# Patient Record
Sex: FEMALE | Race: WHITE | NOT HISPANIC OR LATINO | Employment: OTHER | URBAN - METROPOLITAN AREA
[De-identification: names, ages, dates, MRNs, and addresses within clinical notes are randomized per-mention and may not be internally consistent; named-entity substitution may affect disease eponyms.]

---

## 2018-02-05 ENCOUNTER — OFFICE VISIT (OUTPATIENT)
Dept: URGENT CARE | Facility: CLINIC | Age: 58
End: 2018-02-05
Payer: COMMERCIAL

## 2018-02-05 VITALS
RESPIRATION RATE: 19 BRPM | DIASTOLIC BLOOD PRESSURE: 78 MMHG | SYSTOLIC BLOOD PRESSURE: 143 MMHG | HEART RATE: 71 BPM | BODY MASS INDEX: 26.22 KG/M2 | WEIGHT: 148 LBS | HEIGHT: 63 IN | TEMPERATURE: 99 F | OXYGEN SATURATION: 98 %

## 2018-02-05 DIAGNOSIS — J20.9 ACUTE BRONCHITIS, UNSPECIFIED ORGANISM: Primary | ICD-10-CM

## 2018-02-05 PROCEDURE — 96372 THER/PROPH/DIAG INJ SC/IM: CPT | Mod: S$GLB,,, | Performed by: FAMILY MEDICINE

## 2018-02-05 PROCEDURE — 99203 OFFICE O/P NEW LOW 30 MIN: CPT | Mod: S$GLB,,, | Performed by: NURSE PRACTITIONER

## 2018-02-05 PROCEDURE — 3008F BODY MASS INDEX DOCD: CPT | Mod: S$GLB,,, | Performed by: NURSE PRACTITIONER

## 2018-02-05 RX ORDER — BETAMETHASONE SODIUM PHOSPHATE AND BETAMETHASONE ACETATE 3; 3 MG/ML; MG/ML
6 INJECTION, SUSPENSION INTRA-ARTICULAR; INTRALESIONAL; INTRAMUSCULAR; SOFT TISSUE
Status: COMPLETED | OUTPATIENT
Start: 2018-02-05 | End: 2018-02-05

## 2018-02-05 RX ORDER — TIOTROPIUM BROMIDE 18 UG/1
18 CAPSULE ORAL; RESPIRATORY (INHALATION) DAILY
Qty: 30 CAPSULE | Refills: 2 | Status: SHIPPED | OUTPATIENT
Start: 2018-02-05 | End: 2018-08-14

## 2018-02-05 RX ORDER — PREDNISONE 20 MG/1
20 TABLET ORAL DAILY
Qty: 5 TABLET | Refills: 0 | Status: SHIPPED | OUTPATIENT
Start: 2018-02-05 | End: 2018-02-10

## 2018-02-05 RX ADMIN — BETAMETHASONE SODIUM PHOSPHATE AND BETAMETHASONE ACETATE 6 MG: 3; 3 INJECTION, SUSPENSION INTRA-ARTICULAR; INTRALESIONAL; INTRAMUSCULAR; SOFT TISSUE at 03:02

## 2018-02-05 NOTE — PROGRESS NOTES
"Subjective:       Patient ID: Mallorie rGeen is a 57 y.o. female.    Vitals:  height is 5' 3" (1.6 m) and weight is 67.1 kg (148 lb). Her temperature is 98.5 °F (36.9 °C). Her blood pressure is 143/78 (abnormal) and her pulse is 71. Her respiration is 19 and oxygen saturation is 98%.     Chief Complaint: Cough    Pt states she has been having symptoms for the last week. Has a h/o asthmatic bronchitis and has been using spiriva but she ran out a few days ago. States the cough is now getting worse. Denies fever, chills.       Cough   This is a new problem. The current episode started in the past 7 days. The problem has been gradually worsening. The problem occurs every few minutes. The cough is productive of sputum. Associated symptoms include ear pain. Pertinent negatives include no chest pain, chills, eye redness, fever, headaches, myalgias, rash, sore throat, shortness of breath or wheezing. The symptoms are aggravated by lying down. Treatments tried: mucinex, sudafed and singular. The treatment provided no relief. Her past medical history is significant for bronchitis.     Review of Systems   Constitution: Positive for malaise/fatigue. Negative for chills and fever.   HENT: Positive for ear pain and hoarse voice. Negative for congestion and sore throat.    Eyes: Negative for discharge and redness.   Cardiovascular: Negative for chest pain, dyspnea on exertion and leg swelling.   Respiratory: Positive for cough and sputum production. Negative for shortness of breath and wheezing.    Hematologic/Lymphatic: Negative for adenopathy.   Skin: Negative for color change and rash.   Musculoskeletal: Negative for myalgias.   Gastrointestinal: Negative for abdominal pain and nausea.   Neurological: Negative for dizziness, headaches and light-headedness.   All other systems reviewed and are negative.      Objective:      Physical Exam   Constitutional: She is oriented to person, place, and time. Vital signs are normal. She " appears well-developed and well-nourished.  Non-toxic appearance. She does not have a sickly appearance. She does not appear ill.   Appears run down on assessment   HENT:   Head: Normocephalic and atraumatic.   Right Ear: Hearing, tympanic membrane, external ear and ear canal normal.   Left Ear: Hearing, tympanic membrane, external ear and ear canal normal.   Nose: Nose normal. Right sinus exhibits no maxillary sinus tenderness and no frontal sinus tenderness. Left sinus exhibits no maxillary sinus tenderness and no frontal sinus tenderness.   Mouth/Throat: Uvula is midline, oropharynx is clear and moist and mucous membranes are normal.   Eyes: Conjunctivae and EOM are normal. Pupils are equal, round, and reactive to light.   Neck: Normal range of motion. Neck supple.   Cardiovascular: Normal rate, regular rhythm and normal heart sounds.    Pulmonary/Chest: Effort normal. No respiratory distress. She has no decreased breath sounds. She has no wheezes.   Deep, hacking cough present on exam.    Abdominal: Soft. Bowel sounds are normal. There is no tenderness.   Musculoskeletal: Normal range of motion.   Lymphadenopathy:     She has no cervical adenopathy.   Neurological: She is alert and oriented to person, place, and time.   Skin: Skin is warm and dry. Capillary refill takes less than 2 seconds. No rash noted.   Psychiatric: She has a normal mood and affect.   Nursing note and vitals reviewed.      Assessment:       1. Acute bronchitis, unspecified organism        Plan:         Acute bronchitis, unspecified organism  -     tiotropium (SPIRIVA WITH HANDIHALER) 18 mcg inhalation capsule; Inhale 1 capsule (18 mcg total) into the lungs once daily. Controller  Dispense: 30 capsule; Refill: 2  -     betamethasone acetate-betamethasone sodium phosphate injection 6 mg; Inject 1 mL (6 mg total) into the muscle one time.  -     predniSONE (DELTASONE) 20 MG tablet; Take 1 tablet (20 mg total) by mouth once daily.  Dispense: 5  tablet; Refill: 0      Patient Instructions       Please return here or go to the Emergency Department for any concerns or worsening of condition.  If you were prescribed antibiotics, please take them to completion.  If you were prescribed a narcotic medication, do not drive or operate heavy equipment or machinery while taking these medications.  Please follow up with your primary care doctor or specialist as needed.    If you  smoke, please stop smoking.    Acute Bronchitis  Your healthcare provider has told you that you have acute bronchitis. Bronchitis is infection or inflammation of the bronchial tubes (airways in the lungs). Normally, air moves easily in and out of the airways. Bronchitis narrows the airways, making it harder for air to flow in and out of the lungs. This causes symptoms such as shortness of breath, coughing up yellow or green mucus, and wheezing. Bronchitis can be acute or chronic. Acute means the condition comes on quickly and goes away in a short time, usually within 3 to 10 days. Chronic means a condition lasts a long time and often comes back.    What causes acute bronchitis?  Acute bronchitis almost always starts as a viral respiratory infection, such as a cold or the flu. Certain factors make it more likely for a cold or flu to turn into bronchitis. These include being very young, being elderly, having a heart or lung problem, or having a weak immune system. Cigarette smoking also makes bronchitis more likely.  When bronchitis develops, the airways become swollen. The airways may also become infected with bacteria. This is known as a secondary infection.  Diagnosing acute bronchitis  Your healthcare provider will examine you and ask about your symptoms and health history. You may also have a sputum culture to test the fluid in your lungs. Chest X-rays may be done to look for infection in the lungs.  Treating acute bronchitis  Bronchitis usually clears up as the cold or flu goes away. You  can help feel better faster by doing the following:  · Take medicine as directed. You may be told to take ibuprofen or other over-the-counter medicines. These help relieve inflammation in your bronchial tubes. Your healthcare provider may prescribe an inhaler to help open up the bronchial tubes. Most of the time, acute bronchitis is caused by a viral infection. Antibiotics are usually not prescribed for viral infections.  · Drink plenty of fluids, such as water, juice, or warm soup. Fluids loosen mucus so that you can cough it up. This helps you breathe more easily. Fluids also prevent dehydration.  · Make sure you get plenty of rest.  · Do not smoke. Do not allow anyone else to smoke in your home.  Recovery and follow-up  Follow up with your doctor as you are told. You will likely feel better in a week or two. But a dry cough can linger beyond that time. Let your doctor know if you still have symptoms (other than a dry cough) after 2 weeks, or if youre prone to getting bronchial infections. Take steps to protect yourself from future infections. These steps include stopping smoking and avoiding tobacco smoke, washing your hands often, and getting a yearly flu shot.  When to call your healthcare provider  Call the healthcare provider if you have any of the following:  · Fever of 100.4°F (38.0°C) or higher, or as advised  · Symptoms that get worse, or new symptoms  · Trouble breathing  · Symptoms that dont start to improve within a week, or within 3 days of taking antibiotics   Date Last Reviewed: 12/1/2016  © 5029-7714 [employer] [facility]. [address] [place] [address], [place] [address]. All rights reserved. This information is not intended as a substitute for professional medical care. Always follow your healthcare professional's instructions.        Bronchitis, Viral (Adult)    You have a viral bronchitis. Bronchitis is inflammation and swelling of the lining of the lungs. This is often caused by an infection.  Symptoms include a dry, hacking cough that is worse at night. The cough may bring up yellow-green mucus. You may also feel short of breath or wheeze. Other symptoms may include tiredness, chest discomfort, and chills.  Bronchitis that is caused by a virus is not treated with antibiotics. Instead, medicines may be given to help relieve symptoms. Symptoms can last up to 2 weeks, although the cough may last much longer.  This illness is contagious during the first few days and is spread through the air by coughing and sneezing, or by direct contact (touching the sick person and then touching your own eyes, nose, or mouth).  Most viral illnesses resolve within 10 to 14 days with rest and simple home remedies, although they may sometimes last for several weeks.  Home care  · If symptoms are severe, rest at home for the first 2 to 3 days. When you go back to your usual activities, don't let yourself get too tired.  · Do not smoke. Also avoid being exposed to secondhand smoke.  · You may use over-the-counter medicine to control fever or pain, unless another pain medicine was prescribed. (Note: If you have chronic liver or kidney disease or have ever had a stomach ulcer or gastrointestinal bleeding, talk with your healthcare provider before using these medicines. Also talk to your provider if you are taking medicine to prevent blood clots.) Aspirin should never be given to anyone younger than 18 years of age who is ill with a viral infection or fever. It may cause severe liver or brain damage.  · Your appetite may be poor, so a light diet is fine. Avoid dehydration by drinking 6 to 8 glasses of fluids per day (such as water, soft drinks, sports drinks, juices, tea, or soup). Extra fluids will help loosen secretions in the nose and lungs.  · Over-the-counter cough, cold, and sore-throat medicines will not shorten the length of the illness, but they may help to reduce symptoms. (Note: Do not use decongestants if you have high  blood pressure.)  Follow-up care  Follow up with your healthcare provider, or as advised. If you had an X-ray or ECG (electrocardiogram), a specialist will review it. You will be notified of any new findings that may affect your care.  Note: If you are age 65 or older, or if you have a chronic lung disease or condition that affects your immune system, or you smoke, talk to your healthcare provider about having pneumococcal vaccinations and a yearly influenza vaccination (flu shot).  When to seek medical advice  Call your healthcare provider right away if any of these occur:  · Fever of 100.4°F (38°C) or higher  · Coughing up increased amounts of colored sputum  · Weakness, drowsiness, headache, facial pain, ear pain, or a stiff neck  Call 911, or get immediate medical care  Contact emergency services right away if any of these occur:  · Coughing up blood  · Worsening weakness, drowsiness, headache, or stiff neck  · Trouble breathing, wheezing, or pain with breathing  Date Last Reviewed: 9/13/2015 © 2000-2017 [employer]. [address] [place] [address], [place] [address]. All rights reserved. This information is not intended as a substitute for professional medical care. Always follow your healthcare professional's instructions.

## 2018-02-05 NOTE — PATIENT INSTRUCTIONS
Please return here or go to the Emergency Department for any concerns or worsening of condition.  If you were prescribed antibiotics, please take them to completion.  If you were prescribed a narcotic medication, do not drive or operate heavy equipment or machinery while taking these medications.  Please follow up with your primary care doctor or specialist as needed.    If you  smoke, please stop smoking.    Acute Bronchitis  Your healthcare provider has told you that you have acute bronchitis. Bronchitis is infection or inflammation of the bronchial tubes (airways in the lungs). Normally, air moves easily in and out of the airways. Bronchitis narrows the airways, making it harder for air to flow in and out of the lungs. This causes symptoms such as shortness of breath, coughing up yellow or green mucus, and wheezing. Bronchitis can be acute or chronic. Acute means the condition comes on quickly and goes away in a short time, usually within 3 to 10 days. Chronic means a condition lasts a long time and often comes back.    What causes acute bronchitis?  Acute bronchitis almost always starts as a viral respiratory infection, such as a cold or the flu. Certain factors make it more likely for a cold or flu to turn into bronchitis. These include being very young, being elderly, having a heart or lung problem, or having a weak immune system. Cigarette smoking also makes bronchitis more likely.  When bronchitis develops, the airways become swollen. The airways may also become infected with bacteria. This is known as a secondary infection.  Diagnosing acute bronchitis  Your healthcare provider will examine you and ask about your symptoms and health history. You may also have a sputum culture to test the fluid in your lungs. Chest X-rays may be done to look for infection in the lungs.  Treating acute bronchitis  Bronchitis usually clears up as the cold or flu goes away. You can help feel better faster by doing the  following:  · Take medicine as directed. You may be told to take ibuprofen or other over-the-counter medicines. These help relieve inflammation in your bronchial tubes. Your healthcare provider may prescribe an inhaler to help open up the bronchial tubes. Most of the time, acute bronchitis is caused by a viral infection. Antibiotics are usually not prescribed for viral infections.  · Drink plenty of fluids, such as water, juice, or warm soup. Fluids loosen mucus so that you can cough it up. This helps you breathe more easily. Fluids also prevent dehydration.  · Make sure you get plenty of rest.  · Do not smoke. Do not allow anyone else to smoke in your home.  Recovery and follow-up  Follow up with your doctor as you are told. You will likely feel better in a week or two. But a dry cough can linger beyond that time. Let your doctor know if you still have symptoms (other than a dry cough) after 2 weeks, or if youre prone to getting bronchial infections. Take steps to protect yourself from future infections. These steps include stopping smoking and avoiding tobacco smoke, washing your hands often, and getting a yearly flu shot.  When to call your healthcare provider  Call the healthcare provider if you have any of the following:  · Fever of 100.4°F (38.0°C) or higher, or as advised  · Symptoms that get worse, or new symptoms  · Trouble breathing  · Symptoms that dont start to improve within a week, or within 3 days of taking antibiotics   Date Last Reviewed: 12/1/2016  © 6307-0800 [employer]. [address] [place] [address], [place] [address]. All rights reserved. This information is not intended as a substitute for professional medical care. Always follow your healthcare professional's instructions.        Bronchitis, Viral (Adult)    You have a viral bronchitis. Bronchitis is inflammation and swelling of the lining of the lungs. This is often caused by an infection. Symptoms include a dry, hacking cough that  is worse at night. The cough may bring up yellow-green mucus. You may also feel short of breath or wheeze. Other symptoms may include tiredness, chest discomfort, and chills.  Bronchitis that is caused by a virus is not treated with antibiotics. Instead, medicines may be given to help relieve symptoms. Symptoms can last up to 2 weeks, although the cough may last much longer.  This illness is contagious during the first few days and is spread through the air by coughing and sneezing, or by direct contact (touching the sick person and then touching your own eyes, nose, or mouth).  Most viral illnesses resolve within 10 to 14 days with rest and simple home remedies, although they may sometimes last for several weeks.  Home care  · If symptoms are severe, rest at home for the first 2 to 3 days. When you go back to your usual activities, don't let yourself get too tired.  · Do not smoke. Also avoid being exposed to secondhand smoke.  · You may use over-the-counter medicine to control fever or pain, unless another pain medicine was prescribed. (Note: If you have chronic liver or kidney disease or have ever had a stomach ulcer or gastrointestinal bleeding, talk with your healthcare provider before using these medicines. Also talk to your provider if you are taking medicine to prevent blood clots.) Aspirin should never be given to anyone younger than 18 years of age who is ill with a viral infection or fever. It may cause severe liver or brain damage.  · Your appetite may be poor, so a light diet is fine. Avoid dehydration by drinking 6 to 8 glasses of fluids per day (such as water, soft drinks, sports drinks, juices, tea, or soup). Extra fluids will help loosen secretions in the nose and lungs.  · Over-the-counter cough, cold, and sore-throat medicines will not shorten the length of the illness, but they may help to reduce symptoms. (Note: Do not use decongestants if you have high blood pressure.)  Follow-up care  Follow  up with your healthcare provider, or as advised. If you had an X-ray or ECG (electrocardiogram), a specialist will review it. You will be notified of any new findings that may affect your care.  Note: If you are age 65 or older, or if you have a chronic lung disease or condition that affects your immune system, or you smoke, talk to your healthcare provider about having pneumococcal vaccinations and a yearly influenza vaccination (flu shot).  When to seek medical advice  Call your healthcare provider right away if any of these occur:  · Fever of 100.4°F (38°C) or higher  · Coughing up increased amounts of colored sputum  · Weakness, drowsiness, headache, facial pain, ear pain, or a stiff neck  Call 911, or get immediate medical care  Contact emergency services right away if any of these occur:  · Coughing up blood  · Worsening weakness, drowsiness, headache, or stiff neck  · Trouble breathing, wheezing, or pain with breathing  Date Last Reviewed: 9/13/2015  © 3772-0133 [employer]. [address] [place] [address], [place] [address]. All rights reserved. This information is not intended as a substitute for professional medical care. Always follow your healthcare professional's instructions.

## 2018-08-14 ENCOUNTER — OFFICE VISIT (OUTPATIENT)
Dept: URGENT CARE | Facility: CLINIC | Age: 58
End: 2018-08-14
Payer: COMMERCIAL

## 2018-08-14 VITALS
SYSTOLIC BLOOD PRESSURE: 120 MMHG | WEIGHT: 148 LBS | HEART RATE: 74 BPM | HEIGHT: 63 IN | TEMPERATURE: 97 F | BODY MASS INDEX: 26.22 KG/M2 | DIASTOLIC BLOOD PRESSURE: 80 MMHG | OXYGEN SATURATION: 98 %

## 2018-08-14 DIAGNOSIS — M54.50 ACUTE RIGHT-SIDED LOW BACK PAIN WITHOUT SCIATICA: Primary | ICD-10-CM

## 2018-08-14 PROCEDURE — 3008F BODY MASS INDEX DOCD: CPT | Mod: CPTII,S$GLB,, | Performed by: SURGERY

## 2018-08-14 PROCEDURE — 96372 THER/PROPH/DIAG INJ SC/IM: CPT | Mod: S$GLB,,, | Performed by: SURGERY

## 2018-08-14 PROCEDURE — 99214 OFFICE O/P EST MOD 30 MIN: CPT | Mod: 25,S$GLB,, | Performed by: SURGERY

## 2018-08-14 RX ORDER — BETAMETHASONE SODIUM PHOSPHATE AND BETAMETHASONE ACETATE 3; 3 MG/ML; MG/ML
9 INJECTION, SUSPENSION INTRA-ARTICULAR; INTRALESIONAL; INTRAMUSCULAR; SOFT TISSUE ONCE
Status: COMPLETED | OUTPATIENT
Start: 2018-08-14 | End: 2018-08-14

## 2018-08-14 RX ORDER — DICLOFENAC SODIUM 75 MG/1
75 TABLET, DELAYED RELEASE ORAL 2 TIMES DAILY PRN
Qty: 60 TABLET | Refills: 0 | Status: SHIPPED | OUTPATIENT
Start: 2018-08-14 | End: 2018-09-13

## 2018-08-14 RX ORDER — CYCLOBENZAPRINE HCL 10 MG
10 TABLET ORAL 3 TIMES DAILY PRN
Qty: 21 TABLET | Refills: 0 | Status: SHIPPED | OUTPATIENT
Start: 2018-08-14 | End: 2018-08-21

## 2018-08-14 RX ADMIN — BETAMETHASONE SODIUM PHOSPHATE AND BETAMETHASONE ACETATE 9 MG: 3; 3 INJECTION, SUSPENSION INTRA-ARTICULAR; INTRALESIONAL; INTRAMUSCULAR; SOFT TISSUE at 10:08

## 2018-08-14 NOTE — PATIENT INSTRUCTIONS
Self-Care for Low Back Pain    Most people have low back pain now and then. In many cases, it isnt serious and self-care can help. Sometimes low back pain can be a sign of a bigger problem. Call your healthcare provider if your pain returns often or gets worse over time. For the long-term care of your back, get regular exercise, lose any excess weight and learn good posture.  Take a short rest  Lying down during the day may be beneficial for short periods of time if severe pain increases with sitting or standing. Long-term bed rest could be detrimental.  Reduce pain and swelling  Cold reduces swelling. Both cold and heat can reduce pain. Protect your skin by placing a towel between your body and the ice or heat source.  · For the first few days, apply an ice pack for 15 to 20 minutes .  · After the first few days, try heat for 15 minutes at a time to ease pain. Never sleep on a heating pad.  · Over-the-counter medicine can help control pain and swelling. Try aspirin or ibuprofen.  Exercise  Exercise can help your back heal. It also helps your back get stronger and more flexible, preventing any reinjury. Ask your healthcare provider about specific exercises for your back.  Use good posture to avoid reinjury  · When moving, bend at the hips and knees. Dont bend at the waist or twist around.  · When lifting, keep the object close to your body. Dont try to lift more than you can handle.  · When sitting, keep your lower back supported. Use a rolled-up towel as needed.  Seek immediate medical care if:  · Youre unable to stand or walk.  · You have a temperature over 100.4°F (38.0°C)  · You have frequent, painful, or bloody urination.  · You have severe abdominal pain.  · You have a sharp, stabbing pain.  · Your pain is constant.  · You have pain or numbness in your leg.  · You feel pain in a new area of your back.  · You notice that the pain isnt decreasing after more than a week.   Date Last Reviewed: 9/29/2015  ©  4175-8991 [employer]. [address] [place] [address], [place] [address]. All rights reserved. This information is not intended as a substitute for professional medical care. Always follow your healthcare professional's instructions.

## 2018-08-14 NOTE — PROGRESS NOTES
"Subjective:       Patient ID: Mallorie Green is a 58 y.o. female.    Vitals:  height is 5' 3" (1.6 m) and weight is 67.1 kg (148 lb). Her temperature is 97.2 °F (36.2 °C). Her blood pressure is 120/80 and her pulse is 74. Her oxygen saturation is 98%.     Chief Complaint: Back Pain (leg pain )    Right lower back pain noted after recent increase in work hours, sitting up to 12 hours a day. She has had prior occurrences and improved with steroid shot. She has tried NSAID's with limited effect. She has recent right little toe fracture      Back Pain   This is a chronic problem. The problem occurs constantly. The quality of the pain is described as shooting. Radiates to: Right leg. The pain is at a severity of 6/10. The pain is moderate. The symptoms are aggravated by bending, sitting and twisting. Stiffness is present all day. Associated symptoms include leg pain and numbness. Pertinent negatives include no abdominal pain, chest pain, fever or headaches. She has tried NSAIDs for the symptoms. The treatment provided mild relief.     Review of Systems   Constitution: Negative for chills and fever.   HENT: Negative for sore throat.    Eyes: Negative for blurred vision.   Cardiovascular: Negative for chest pain.   Respiratory: Negative for shortness of breath.    Skin: Negative for rash.   Musculoskeletal: Positive for back pain, joint pain, joint swelling and stiffness.        Numbness and tingling sensation in right leg radiating from back   Gastrointestinal: Negative for abdominal pain, diarrhea, nausea and vomiting.   Neurological: Positive for numbness. Negative for headaches.   Psychiatric/Behavioral: The patient is not nervous/anxious.        Objective:      Physical Exam   Constitutional: She is oriented to person, place, and time. Vital signs are normal. She appears well-developed and well-nourished. She is active and cooperative. No distress.   HENT:   Head: Normocephalic and atraumatic.   Nose: Nose normal. "   Mouth/Throat: Oropharynx is clear and moist and mucous membranes are normal.   Eyes: Conjunctivae and lids are normal.   Neck: Trachea normal, normal range of motion, full passive range of motion without pain and phonation normal. Neck supple.   Cardiovascular: Normal rate, regular rhythm, normal heart sounds, intact distal pulses and normal pulses.   Pulmonary/Chest: Effort normal and breath sounds normal.   Abdominal: Soft. Normal appearance and bowel sounds are normal. She exhibits no abdominal bruit, no pulsatile midline mass and no mass.   Musculoskeletal: She exhibits no edema or deformity.        Lumbar back: She exhibits decreased range of motion, tenderness and spasm.        Back:    Neurological: She is alert and oriented to person, place, and time. She has normal strength and normal reflexes. No sensory deficit.   Skin: Skin is warm, dry and intact. She is not diaphoretic.   Psychiatric: She has a normal mood and affect. Her speech is normal and behavior is normal. Judgment and thought content normal. Cognition and memory are normal.   Nursing note and vitals reviewed.      Assessment:       1. Acute right-sided low back pain without sciatica        Plan:         Acute right-sided low back pain without sciatica  -     betamethasone acetate-betamethasone sodium phosphate injection 9 mg; Inject 1.5 mLs (9 mg total) into the muscle once.  -     diclofenac (VOLTAREN) 75 MG EC tablet; Take 1 tablet (75 mg total) by mouth 2 (two) times daily as needed.  Dispense: 60 tablet; Refill: 0  -     cyclobenzaprine (FLEXERIL) 10 MG tablet; Take 1 tablet (10 mg total) by mouth 3 (three) times daily as needed for Muscle spasms.  Dispense: 21 tablet; Refill: 0      Patient Instructions       Self-Care for Low Back Pain    Most people have low back pain now and then. In many cases, it isnt serious and self-care can help. Sometimes low back pain can be a sign of a bigger problem. Call your healthcare provider if your pain  returns often or gets worse over time. For the long-term care of your back, get regular exercise, lose any excess weight and learn good posture.  Take a short rest  Lying down during the day may be beneficial for short periods of time if severe pain increases with sitting or standing. Long-term bed rest could be detrimental.  Reduce pain and swelling  Cold reduces swelling. Both cold and heat can reduce pain. Protect your skin by placing a towel between your body and the ice or heat source.  · For the first few days, apply an ice pack for 15 to 20 minutes .  · After the first few days, try heat for 15 minutes at a time to ease pain. Never sleep on a heating pad.  · Over-the-counter medicine can help control pain and swelling. Try aspirin or ibuprofen.  Exercise  Exercise can help your back heal. It also helps your back get stronger and more flexible, preventing any reinjury. Ask your healthcare provider about specific exercises for your back.  Use good posture to avoid reinjury  · When moving, bend at the hips and knees. Dont bend at the waist or twist around.  · When lifting, keep the object close to your body. Dont try to lift more than you can handle.  · When sitting, keep your lower back supported. Use a rolled-up towel as needed.  Seek immediate medical care if:  · Youre unable to stand or walk.  · You have a temperature over 100.4°F (38.0°C)  · You have frequent, painful, or bloody urination.  · You have severe abdominal pain.  · You have a sharp, stabbing pain.  · Your pain is constant.  · You have pain or numbness in your leg.  · You feel pain in a new area of your back.  · You notice that the pain isnt decreasing after more than a week.   Date Last Reviewed: 9/29/2015  © 2675-3665 [employer]. [address] [place] [address], [place] [address]. All rights reserved. This information is not intended as a substitute for professional medical care. Always follow your healthcare professional's  instructions.

## 2019-09-01 ENCOUNTER — OFFICE VISIT (OUTPATIENT)
Dept: URGENT CARE | Facility: CLINIC | Age: 59
End: 2019-09-01
Payer: COMMERCIAL

## 2019-09-01 VITALS
SYSTOLIC BLOOD PRESSURE: 120 MMHG | DIASTOLIC BLOOD PRESSURE: 70 MMHG | BODY MASS INDEX: 24.75 KG/M2 | OXYGEN SATURATION: 99 % | TEMPERATURE: 98 F | RESPIRATION RATE: 18 BRPM | WEIGHT: 145 LBS | HEIGHT: 64 IN | HEART RATE: 68 BPM

## 2019-09-01 DIAGNOSIS — H92.01 OTALGIA OF RIGHT EAR: ICD-10-CM

## 2019-09-01 DIAGNOSIS — J01.00 ACUTE MAXILLARY SINUSITIS, RECURRENCE NOT SPECIFIED: Primary | ICD-10-CM

## 2019-09-01 PROCEDURE — 96372 THER/PROPH/DIAG INJ SC/IM: CPT | Mod: S$GLB,,, | Performed by: FAMILY MEDICINE

## 2019-09-01 PROCEDURE — 99214 PR OFFICE/OUTPT VISIT, EST, LEVL IV, 30-39 MIN: ICD-10-PCS | Mod: 25,S$GLB,, | Performed by: NURSE PRACTITIONER

## 2019-09-01 PROCEDURE — 3008F BODY MASS INDEX DOCD: CPT | Mod: CPTII,S$GLB,, | Performed by: NURSE PRACTITIONER

## 2019-09-01 PROCEDURE — 96372 PR INJECTION,THERAP/PROPH/DIAG2ST, IM OR SUBCUT: ICD-10-PCS | Mod: S$GLB,,, | Performed by: FAMILY MEDICINE

## 2019-09-01 PROCEDURE — 3008F PR BODY MASS INDEX (BMI) DOCUMENTED: ICD-10-PCS | Mod: CPTII,S$GLB,, | Performed by: NURSE PRACTITIONER

## 2019-09-01 PROCEDURE — 99214 OFFICE O/P EST MOD 30 MIN: CPT | Mod: 25,S$GLB,, | Performed by: NURSE PRACTITIONER

## 2019-09-01 RX ORDER — AMOXICILLIN AND CLAVULANATE POTASSIUM 875; 125 MG/1; MG/1
1 TABLET, FILM COATED ORAL EVERY 12 HOURS
Qty: 14 TABLET | Refills: 0 | Status: SHIPPED | OUTPATIENT
Start: 2019-09-01 | End: 2019-09-01 | Stop reason: SDUPTHER

## 2019-09-01 RX ORDER — BETAMETHASONE SODIUM PHOSPHATE AND BETAMETHASONE ACETATE 3; 3 MG/ML; MG/ML
6 INJECTION, SUSPENSION INTRA-ARTICULAR; INTRALESIONAL; INTRAMUSCULAR; SOFT TISSUE
Status: COMPLETED | OUTPATIENT
Start: 2019-09-01 | End: 2019-09-01

## 2019-09-01 RX ORDER — AMOXICILLIN AND CLAVULANATE POTASSIUM 875; 125 MG/1; MG/1
1 TABLET, FILM COATED ORAL EVERY 12 HOURS
Qty: 14 TABLET | Refills: 0 | Status: SHIPPED | OUTPATIENT
Start: 2019-09-01 | End: 2019-09-08

## 2019-09-01 RX ADMIN — BETAMETHASONE SODIUM PHOSPHATE AND BETAMETHASONE ACETATE 6 MG: 3; 3 INJECTION, SUSPENSION INTRA-ARTICULAR; INTRALESIONAL; INTRAMUSCULAR; SOFT TISSUE at 03:09

## 2019-09-01 NOTE — PROGRESS NOTES
"Subjective:       Patient ID: Mallorie Green is a 59 y.o. female.    Vitals:  height is 5' 4" (1.626 m) and weight is 65.8 kg (145 lb). Her temperature is 97.6 °F (36.4 °C). Her blood pressure is 120/70 and her pulse is 68. Her respiration is 18 and oxygen saturation is 99%.     Chief Complaint: Sinus Problem    Pt stated that she have been having a cough, postnasal drip, and congestion for about two weeks now. She is coughing up yellow mucus. Denies fever. She have been taking ChlorTabs, teslon pearls, and oral decongestions.     Sinus Problem   This is a new problem. The current episode started 1 to 4 weeks ago. The problem has been gradually worsening since onset. There has been no fever. She is experiencing no pain. Associated symptoms include congestion, coughing, ear pain and sinus pressure. Pertinent negatives include no chills, headaches, shortness of breath or sore throat. Past treatments include acetaminophen and oral decongestants. The treatment provided no relief.       Constitution: Negative for chills, fatigue and fever.   HENT: Positive for ear pain, congestion, postnasal drip, sinus pain and sinus pressure. Negative for sore throat.    Neck: Negative for painful lymph nodes.   Cardiovascular: Negative for chest pain and leg swelling.   Eyes: Negative for double vision and blurred vision.   Respiratory: Positive for cough and sputum production. Negative for shortness of breath.    Gastrointestinal: Negative for nausea, vomiting and diarrhea.   Genitourinary: Negative for dysuria, frequency, urgency and history of kidney stones.   Musculoskeletal: Negative for joint pain, joint swelling, muscle cramps and muscle ache.   Skin: Negative for color change, pale, rash and bruising.   Allergic/Immunologic: Negative for seasonal allergies.   Neurological: Negative for dizziness, history of vertigo, light-headedness, passing out and headaches.   Hematologic/Lymphatic: Negative for swollen lymph nodes. "   Psychiatric/Behavioral: Negative for nervous/anxious, sleep disturbance and depression. The patient is not nervous/anxious.        Objective:      Physical Exam   Constitutional: She is oriented to person, place, and time. She appears well-developed and well-nourished. She is cooperative.  Non-toxic appearance. She does not appear ill. No distress.   HENT:   Head: Normocephalic and atraumatic.   Right Ear: Hearing, tympanic membrane, external ear and ear canal normal. No drainage, swelling or tenderness. No middle ear effusion.   Left Ear: Hearing, tympanic membrane, external ear and ear canal normal. No drainage, swelling or tenderness.  No middle ear effusion.   Nose: Mucosal edema present. No rhinorrhea or nasal deformity. No epistaxis. Right sinus exhibits maxillary sinus tenderness. Right sinus exhibits no frontal sinus tenderness. Left sinus exhibits maxillary sinus tenderness. Left sinus exhibits no frontal sinus tenderness.   Mouth/Throat: Uvula is midline and mucous membranes are normal. No trismus in the jaw. Normal dentition. No uvula swelling. Posterior oropharyngeal erythema present.   Eyes: Conjunctivae and lids are normal. No scleral icterus.   Sclera clear bilat   Neck: Trachea normal, full passive range of motion without pain and phonation normal. Neck supple.   Cardiovascular: Normal rate, regular rhythm, normal heart sounds, intact distal pulses and normal pulses.   Pulmonary/Chest: Effort normal and breath sounds normal. No respiratory distress. She has no wheezes.   NON PRODUCTIVE COUGH PRESENT THROUGHOUT EXAM.  Deep inspiration causes coughing   Abdominal: Soft. Normal appearance and bowel sounds are normal. She exhibits no distension. There is no tenderness.   Musculoskeletal: Normal range of motion. She exhibits no edema or deformity.   Neurological: She is alert and oriented to person, place, and time. She exhibits normal muscle tone. Coordination normal.   Skin: Skin is warm, dry and  intact. She is not diaphoretic. No pallor.   Psychiatric: She has a normal mood and affect. Her speech is normal and behavior is normal. Judgment and thought content normal. Cognition and memory are normal.   Nursing note and vitals reviewed.      Assessment:       1. Acute maxillary sinusitis, recurrence not specified    2. Otalgia of right ear        Plan:       Offered benzonatate. Patient taking benzonatate currently.   Acute maxillary sinusitis, recurrence not specified  -     betamethasone acetate-betamethasone sodium phosphate injection 6 mg  -     Discontinue: amoxicillin-clavulanate 875-125mg (AUGMENTIN) 875-125 mg per tablet; Take 1 tablet by mouth every 12 (twelve) hours. for 7 days  Dispense: 14 tablet; Refill: 0  -     amoxicillin-clavulanate 875-125mg (AUGMENTIN) 875-125 mg per tablet; Take 1 tablet by mouth every 12 (twelve) hours. for 7 days  Dispense: 14 tablet; Refill: 0    Otalgia of right ear      Patient Instructions   PLEASE READ YOUR DISCHARGE INSTRUCTIONS ENTIRELY AS IT CONTAINS IMPORTANT INFORMATION.    Try over the counter afrin, but for NO LONGER than 3 days as it can cause rebound congestion. Then you can switch to flonase if you find the nasal sprays are working well.     If you find this dries your nose out or your nose bleeds, try using over the counter nasal saline a few minutes prior to using the flonase to moisten the lining of your nose and throughout the day as needed.     Please take an over the counter antihistamine medication (allegra/Claritin/Zyrtec) of your choice as directed and mucinex-D (if it gives you funny heartbeats you can switch to regular mucinex).     You can try breathe right strips at night to help you breathe.  A cool mist humidifier in bedroom may help with cough and relieve stuffy nose.     Sore throat recommendations: Warm fluids, warm salt water gargles, throat lozenges, tea, honey, soup, rest, hydration.     Sinus rinses DO NOT USE TAP WATER, if you must,  water must be a rolling boil for 1 minute, let it cool, then use.  May use distilled water, or over the counter nasal saline rinses.  Vics vapor rub in shower to help open nasal passages.  May use nasal gel to keep passages moisturized.  May use Nasal saline sprays during the day for added relief of congestion.   For those who go to the gym, please do not use the sauna or steam room now to clear sinuses.    During pollen season, change shirt if you are outside for a while when you go in.  Also wash your face.  Do not touch your face with your hands.  Wash your hands often in general while ill, avoid face contact with hands. Good nutrition. Lots of rest. Plenty of fluids    Over the counter you can use Tylenol (acetominophen) or Ibuprofen for your minor aches and pains as long as you have no contraindications.        Please return or see your primary care doctor if you develop new or worsening symptoms.     Please arrange follow up with your primary medical clinic as soon as possible. You must understand that you've received an Urgent Care treatment only and that you may be released before all of your medical problems are known or treated. You, the patient, will arrange for follow up as instructed. If your symptoms worsen or fail to improve you should go to the Emergency Room.            Sinusitis (Antibiotic Treatment)    The sinuses are air-filled spaces within the bones of the face. They connect to the inside of the nose. Sinusitis is an inflammation of the tissue lining the sinus cavity. Sinus inflammation can occur during a cold. It can also be due to allergies to pollens and other particles in the air. Sinusitis can cause symptoms of sinus congestion and fullness. A sinus infection causes fever, headache and facial pain. There is often green or yellow drainage from the nose or into the back of the throat (post-nasal drip). You have been given antibiotics to treat this condition.  Home care:  · Take the full  course of antibiotics as instructed. Do not stop taking them, even if you feel better.  · Drink plenty of water, hot tea, and other liquids. This may help thin mucus. It also may promote sinus drainage.  · Heat may help soothe painful areas of the face. Use a towel soaked in hot water. Or,  the shower and direct the hot spray onto your face. Using a vaporizer along with a menthol rub at night may also help.   · An expectorant containing guaifenesin may help thin the mucus and promote drainage from the sinuses.  · Over-the-counter decongestants may be used unless a similar medicine was prescribed. Nasal sprays work the fastest. Use one that contains phenylephrine or oxymetazoline. First blow the nose gently. Then use the spray. Do not use these medicines more often than directed on the label or symptoms may get worse. You may also use tablets containing pseudoephedrine. Avoid products that combine ingredients, because side effects may be increased. Read labels. You can also ask the pharmacist for help. (NOTE: Persons with high blood pressure should not use decongestants. They can raise blood pressure.)  · Over-the-counter antihistamines may help if allergies contributed to your sinusitis.    · Do not use nasal rinses or irrigation during an acute sinus infection, unless told to by your health care provider. Rinsing may spread the infection to other sinuses.  · Use acetaminophen or ibuprofen to control pain, unless another pain medicine was prescribed. (If you have chronic liver or kidney disease or ever had a stomach ulcer, talk with your doctor before using these medicines. Aspirin should never be used in anyone under 18 years of age who is ill with a fever. It may cause severe liver damage.)  · Don't smoke. This can worsen symptoms.  Follow-up care  Follow up with your healthcare provider or our staff if you are not improving within the next week.  When to seek medical advice  Call your healthcare provider  if any of these occur:  · Facial pain or headache becoming more severe  · Stiff neck  · Unusual drowsiness or confusion  · Swelling of the forehead or eyelids  · Vision problems, including blurred or double vision  · Fever of 100.4ºF (38ºC) or higher, or as directed by your healthcare provider  · Seizure  · Breathing problems  · Symptoms not resolving within 10 days  Date Last Reviewed: 4/13/2015  © 6510-9313 [employer]. [address] [place] [address], [place] [id number]. All rights reserved. This information is not intended as a substitute for professional medical care. Always follow your healthcare professional's instructions.

## 2019-09-01 NOTE — PATIENT INSTRUCTIONS
PLEASE READ YOUR DISCHARGE INSTRUCTIONS ENTIRELY AS IT CONTAINS IMPORTANT INFORMATION.    Try over the counter afrin, but for NO LONGER than 3 days as it can cause rebound congestion. Then you can switch to flonase if you find the nasal sprays are working well.     If you find this dries your nose out or your nose bleeds, try using over the counter nasal saline a few minutes prior to using the flonase to moisten the lining of your nose and throughout the day as needed.     Please take an over the counter antihistamine medication (allegra/Claritin/Zyrtec) of your choice as directed and mucinex-D (if it gives you funny heartbeats you can switch to regular mucinex).     You can try breathe right strips at night to help you breathe.  A cool mist humidifier in bedroom may help with cough and relieve stuffy nose.     Sore throat recommendations: Warm fluids, warm salt water gargles, throat lozenges, tea, honey, soup, rest, hydration.     Sinus rinses DO NOT USE TAP WATER, if you must, water must be a rolling boil for 1 minute, let it cool, then use.  May use distilled water, or over the counter nasal saline rinses.  Vics vapor rub in shower to help open nasal passages.  May use nasal gel to keep passages moisturized.  May use Nasal saline sprays during the day for added relief of congestion.   For those who go to the gym, please do not use the sauna or steam room now to clear sinuses.    During pollen season, change shirt if you are outside for a while when you go in.  Also wash your face.  Do not touch your face with your hands.  Wash your hands often in general while ill, avoid face contact with hands. Good nutrition. Lots of rest. Plenty of fluids    Over the counter you can use Tylenol (acetominophen) or Ibuprofen for your minor aches and pains as long as you have no contraindications.        Please return or see your primary care doctor if you develop new or worsening symptoms.     Please arrange follow up with your  primary medical clinic as soon as possible. You must understand that you've received an Urgent Care treatment only and that you may be released before all of your medical problems are known or treated. You, the patient, will arrange for follow up as instructed. If your symptoms worsen or fail to improve you should go to the Emergency Room.            Sinusitis (Antibiotic Treatment)    The sinuses are air-filled spaces within the bones of the face. They connect to the inside of the nose. Sinusitis is an inflammation of the tissue lining the sinus cavity. Sinus inflammation can occur during a cold. It can also be due to allergies to pollens and other particles in the air. Sinusitis can cause symptoms of sinus congestion and fullness. A sinus infection causes fever, headache and facial pain. There is often green or yellow drainage from the nose or into the back of the throat (post-nasal drip). You have been given antibiotics to treat this condition.  Home care:  · Take the full course of antibiotics as instructed. Do not stop taking them, even if you feel better.  · Drink plenty of water, hot tea, and other liquids. This may help thin mucus. It also may promote sinus drainage.  · Heat may help soothe painful areas of the face. Use a towel soaked in hot water. Or,  the shower and direct the hot spray onto your face. Using a vaporizer along with a menthol rub at night may also help.   · An expectorant containing guaifenesin may help thin the mucus and promote drainage from the sinuses.  · Over-the-counter decongestants may be used unless a similar medicine was prescribed. Nasal sprays work the fastest. Use one that contains phenylephrine or oxymetazoline. First blow the nose gently. Then use the spray. Do not use these medicines more often than directed on the label or symptoms may get worse. You may also use tablets containing pseudoephedrine. Avoid products that combine ingredients, because side effects may be  increased. Read labels. You can also ask the pharmacist for help. (NOTE: Persons with high blood pressure should not use decongestants. They can raise blood pressure.)  · Over-the-counter antihistamines may help if allergies contributed to your sinusitis.    · Do not use nasal rinses or irrigation during an acute sinus infection, unless told to by your health care provider. Rinsing may spread the infection to other sinuses.  · Use acetaminophen or ibuprofen to control pain, unless another pain medicine was prescribed. (If you have chronic liver or kidney disease or ever had a stomach ulcer, talk with your doctor before using these medicines. Aspirin should never be used in anyone under 18 years of age who is ill with a fever. It may cause severe liver damage.)  · Don't smoke. This can worsen symptoms.  Follow-up care  Follow up with your healthcare provider or our staff if you are not improving within the next week.  When to seek medical advice  Call your healthcare provider if any of these occur:  · Facial pain or headache becoming more severe  · Stiff neck  · Unusual drowsiness or confusion  · Swelling of the forehead or eyelids  · Vision problems, including blurred or double vision  · Fever of 100.4ºF (38ºC) or higher, or as directed by your healthcare provider  · Seizure  · Breathing problems  · Symptoms not resolving within 10 days  Date Last Reviewed: 4/13/2015  © 9524-6070 [employer]. [address] [place] [address], [place] [address]. All rights reserved. This information is not intended as a substitute for professional medical care. Always follow your healthcare professional's instructions.

## 2022-11-02 ENCOUNTER — OFFICE VISIT (OUTPATIENT)
Dept: CARDIOLOGY | Facility: CLINIC | Age: 62
End: 2022-11-02
Payer: COMMERCIAL

## 2022-11-02 VITALS
HEIGHT: 64 IN | WEIGHT: 140.19 LBS | HEART RATE: 55 BPM | SYSTOLIC BLOOD PRESSURE: 119 MMHG | DIASTOLIC BLOOD PRESSURE: 58 MMHG | BODY MASS INDEX: 23.93 KG/M2

## 2022-11-02 DIAGNOSIS — E78.2 MIXED HYPERLIPIDEMIA: ICD-10-CM

## 2022-11-02 DIAGNOSIS — R07.89 OTHER CHEST PAIN: Primary | ICD-10-CM

## 2022-11-02 DIAGNOSIS — R53.82 CHRONIC FATIGUE: ICD-10-CM

## 2022-11-02 DIAGNOSIS — Z82.49 FAMILY HISTORY OF PREMATURE CAD: ICD-10-CM

## 2022-11-02 DIAGNOSIS — Z85.3 HISTORY OF BREAST CANCER: ICD-10-CM

## 2022-11-02 PROCEDURE — 93010 EKG 12-LEAD: ICD-10-PCS | Mod: S$GLB,,, | Performed by: INTERNAL MEDICINE

## 2022-11-02 PROCEDURE — 1159F PR MEDICATION LIST DOCUMENTED IN MEDICAL RECORD: ICD-10-PCS | Mod: CPTII,S$GLB,, | Performed by: INTERNAL MEDICINE

## 2022-11-02 PROCEDURE — 93000 PR ELECTROCARDIOGRAM, COMPLETE: ICD-10-PCS | Mod: S$GLB,,, | Performed by: INTERNAL MEDICINE

## 2022-11-02 PROCEDURE — 99999 PR PBB SHADOW E&M-NEW PATIENT-LVL III: ICD-10-PCS | Mod: PBBFAC,,, | Performed by: INTERNAL MEDICINE

## 2022-11-02 PROCEDURE — 1159F MED LIST DOCD IN RCRD: CPT | Mod: CPTII,S$GLB,, | Performed by: INTERNAL MEDICINE

## 2022-11-02 PROCEDURE — 3078F PR MOST RECENT DIASTOLIC BLOOD PRESSURE < 80 MM HG: ICD-10-PCS | Mod: CPTII,S$GLB,, | Performed by: INTERNAL MEDICINE

## 2022-11-02 PROCEDURE — 3008F PR BODY MASS INDEX (BMI) DOCUMENTED: ICD-10-PCS | Mod: CPTII,S$GLB,, | Performed by: INTERNAL MEDICINE

## 2022-11-02 PROCEDURE — 93005 ELECTROCARDIOGRAM TRACING: CPT | Mod: S$GLB,,, | Performed by: INTERNAL MEDICINE

## 2022-11-02 PROCEDURE — 3078F DIAST BP <80 MM HG: CPT | Mod: CPTII,S$GLB,, | Performed by: INTERNAL MEDICINE

## 2022-11-02 PROCEDURE — 93000 ELECTROCARDIOGRAM COMPLETE: CPT | Mod: S$GLB,,, | Performed by: INTERNAL MEDICINE

## 2022-11-02 PROCEDURE — 93010 ELECTROCARDIOGRAM REPORT: CPT | Mod: S$GLB,,, | Performed by: INTERNAL MEDICINE

## 2022-11-02 PROCEDURE — 93005 EKG 12-LEAD: ICD-10-PCS | Mod: S$GLB,,, | Performed by: INTERNAL MEDICINE

## 2022-11-02 PROCEDURE — 99999 PR PBB SHADOW E&M-NEW PATIENT-LVL III: CPT | Mod: PBBFAC,,, | Performed by: INTERNAL MEDICINE

## 2022-11-02 PROCEDURE — 3008F BODY MASS INDEX DOCD: CPT | Mod: CPTII,S$GLB,, | Performed by: INTERNAL MEDICINE

## 2022-11-02 PROCEDURE — 3074F PR MOST RECENT SYSTOLIC BLOOD PRESSURE < 130 MM HG: ICD-10-PCS | Mod: CPTII,S$GLB,, | Performed by: INTERNAL MEDICINE

## 2022-11-02 PROCEDURE — 3074F SYST BP LT 130 MM HG: CPT | Mod: CPTII,S$GLB,, | Performed by: INTERNAL MEDICINE

## 2022-11-02 RX ORDER — ATORVASTATIN CALCIUM 40 MG/1
40 TABLET, FILM COATED ORAL DAILY
Qty: 90 TABLET | Refills: 3 | Status: SHIPPED | OUTPATIENT
Start: 2022-11-02 | End: 2023-11-13 | Stop reason: SDUPTHER

## 2022-11-02 NOTE — PATIENT INSTRUCTIONS
Assessment/Plan:  Mallorie Green is a 62 y.o. female with breast cancer s/p right mastectomy with radiation/chemotherapy (2000), HLD, who presents for an initial appointment.     Chest Pain/Fatigue- Pt with risk factors for CAD.  Check exercise nuclear stress test and echo to evaluate further.    2. HLD- ASCVD risk score is 4%.  Start atorvastatin 40 mg daily and ASA 81 mg daily.      Will call pt with results of stress test  Otherwise, follow up in 4 months with lipids and cmp prior

## 2022-11-02 NOTE — PROGRESS NOTES
"[facility]      Chief Complaint   Patient presents with    Establish Care       Patient ID: Mallorie Green is a 62 y.o. female with breast cancer s/p right mastectomy with radiation/chemotherapy (), HLD, who presents for an initial appointment.  Pertinent history/events are as follows:     -Pt presents to establish cardiac care.    HPI:  Mrs. Green reports chest pain localized at the left breast area (behind the clavicle) when she "gets upset".  Pain is described as "sharp", 6/10 in intensity, lasting a few seconds.  Episodes occur 2-3 times per week.  Last episode occurred last night.  Pt currently with no chest pain.  She reports worsening fatigue over the past 1 year.  No smoking history.  Reports family history of CAD with MI in mother at age 55.  Pt is a retired .  Outside labs from 2022 shows total cholesterol 230 with .  EKG today shows sinus bradycardia (rate 47 bpm) with no ischemic ST/T wave changes.     Past Medical History:   Diagnosis Date    Cancer      Past Surgical History:   Procedure Laterality Date    BRAIN SURGERY      BREAST SURGERY       SECTION      CHOLECYSTECTOMY      HYSTERECTOMY       Social History     Socioeconomic History    Marital status:    Tobacco Use    Smoking status: Never    Smokeless tobacco: Never   Substance and Sexual Activity    Alcohol use: No    Drug use: No    Sexual activity: Never     History reviewed. No pertinent family history.    Review of patient's allergies indicates:   Allergen Reactions    Codeine     Levaquin [levofloxacin]            Review of Systems  Constitution: Denies chills, fever, and sweats.  HENT: Denies headaches or blurry vision.  Cardiovascular: Positive for chest pain.  Respiratory: Denies cough or shortness of breath.  Gastrointestinal: Denies abdominal pain, nausea, or vomiting.  Musculoskeletal: Denies muscle cramps.  Neurological: Denies dizziness or focal " "weakness.  Psychiatric/Behavioral: Normal mental status.  Hematologic/Lymphatic: Denies bleeding problem or easy bruising/bleeding.  Skin: Denies rash or suspicious lesions    Physical Examination  BP (!) 119/58   Pulse (!) 55   Ht 5' 4" (1.626 m)   Wt 63.6 kg (140 lb 3.4 oz)   BMI 24.07 kg/m²     Constitutional: No acute distress, conversant  HEENT: Sclera anicteric, Pupils equal, round and reactive to light, extraocular motions intact, Oropharynx clear  Neck: No JVD, no carotid bruits  Cardiovascular: regular rate and rhythm, no murmur, rubs or gallops, normal S1/S2  Pulmonary: Clear to auscultation bilaterally  Abdominal: Abdomen soft, nontender, nondistended, positive bowel sounds  Extremities: No lower extremity edema,   Pulses:  Carotid pulses are 2+ on the right side, and 2+ on the left side.  Radial pulses are 2+ on the right side, and 2+ on the left side.   Femoral pulses are 2+ on the right side, and 2+ on the left side.  Popliteal pulses are 2+ on the right side, and 2+ on the left side.   Dorsalis pedis pulses are 2+ on the right side, and 2+ on the left side.   Posterior tibial pulses are 2+ on the right side, and 2+ on the left side.    Skin: No ecchymosis, erythema, or ulcers  Psych: Alert and oriented x 3, appropriate affect  Neuro: CNII-XII intact, no focal deficits    Labs:  Most Recent Data  CBC: No results found for: WBC, HGB, HCT, PLT, MCV, RDW  BMP: No results found for: NA, K, CL, CO2, BUN, CREATININE, GLU, CALCIUM, MG, PHOS  LFTS; No results found for: PROT, ALBUMIN, BILITOT, AST, ALKPHOS, ALT, GGT  COAGS: No results found for: INR, PROTIME, PTT  FLP: No results found for: CHOL, HDL, LDLCALC, TRIG, CHOLHDL  CARDIAC: No results found for: TROPONINI, CKMB, BNP    Imaging:    EKG 11/2/2022:  Sinus bradycardia (rate 47 bpm) with no ischemic ST/T wave changes    Assessment/Plan:  Mallorie Green is a 62 y.o. female with breast cancer s/p right mastectomy with radiation/chemotherapy (2000), " HLD, who presents for an initial appointment.     Chest Pain/Fatigue- Pt with risk factors for CAD.  Check exercise nuclear stress test and echo to evaluate further.    2. HLD- ASCVD risk score is 4%.  Start atorvastatin 40 mg daily and ASA 81 mg daily.      Will call pt with results of stress test  Otherwise, follow up in 4 months with lipids and cmp prior    Total duration of face to face visit time 45 minutes.  Total time spent counseling greater than fifty percent of total visit time.  Counseling included discussion regarding imaging findings, diagnosis, possibilities, treatment options, risks and benefits.  The patient had many questions regarding the options and long-term effects.    Barrera Sewell MD, PhD  Interventional Cardiology

## 2022-11-03 ENCOUNTER — HOSPITAL ENCOUNTER (OUTPATIENT)
Dept: CARDIOLOGY | Facility: HOSPITAL | Age: 62
Discharge: HOME | End: 2022-11-03
Attending: INTERNAL MEDICINE
Payer: COMMERCIAL

## 2022-11-03 VITALS
BODY MASS INDEX: 23.9 KG/M2 | DIASTOLIC BLOOD PRESSURE: 75 MMHG | HEIGHT: 64 IN | SYSTOLIC BLOOD PRESSURE: 139 MMHG | WEIGHT: 140 LBS | HEART RATE: 57 BPM

## 2022-11-03 VITALS
HEIGHT: 64 IN | HEART RATE: 55 BPM | WEIGHT: 140 LBS | SYSTOLIC BLOOD PRESSURE: 122 MMHG | BODY MASS INDEX: 23.9 KG/M2 | DIASTOLIC BLOOD PRESSURE: 80 MMHG

## 2022-11-03 DIAGNOSIS — R07.89 OTHER CHEST PAIN: ICD-10-CM

## 2022-11-03 LAB
ASCENDING AORTA: 2.88 CM
AV INDEX (PROSTH): 0.85
AV MEAN GRADIENT: 1 MMHG
AV PEAK GRADIENT: 3 MMHG
AV VALVE AREA: 2.81 CM2
AV VELOCITY RATIO: 0.81
BSA FOR ECHO PROCEDURE: 1.69 M2
CV ECHO LV RWT: 0.28 CM
CV STRESS BASE HR: 57 BPM
DIASTOLIC BLOOD PRESSURE: 75 MMHG
DOP CALC AO PEAK VEL: 0.8 M/S
DOP CALC AO VTI: 17.72 CM
DOP CALC LVOT AREA: 3.3 CM2
DOP CALC LVOT DIAMETER: 2.05 CM
DOP CALC LVOT PEAK VEL: 0.65 M/S
DOP CALC LVOT STROKE VOLUME: 49.81 CM3
DOP CALCLVOT PEAK VEL VTI: 15.1 CM
E WAVE DECELERATION TIME: 221.07 MSEC
E/A RATIO: 0.99
E/E' RATIO: 10.8 M/S
ECHO LV POSTERIOR WALL: 0.64 CM (ref 0.6–1.1)
EJECTION FRACTION- HIGH: 65 %
EJECTION FRACTION: 65 %
END DIASTOLIC INDEX-HIGH: 153 ML/M2
END DIASTOLIC INDEX-LOW: 93 ML/M2
END SYSTOLIC INDEX-HIGH: 71 ML/M2
END SYSTOLIC INDEX-LOW: 31 ML/M2
FRACTIONAL SHORTENING: 35 % (ref 28–44)
INTERVENTRICULAR SEPTUM: 0.66 CM (ref 0.6–1.1)
LA MAJOR: 4.44 CM
LA MINOR: 4.51 CM
LA WIDTH: 3.32 CM
LEFT ATRIUM SIZE: 3.09 CM
LEFT ATRIUM VOLUME INDEX MOD: 20.3 ML/M2
LEFT ATRIUM VOLUME INDEX: 23.2 ML/M2
LEFT ATRIUM VOLUME MOD: 34.06 CM3
LEFT ATRIUM VOLUME: 39.02 CM3
LEFT INTERNAL DIMENSION IN SYSTOLE: 2.93 CM (ref 2.1–4)
LEFT VENTRICLE DIASTOLIC VOLUME INDEX: 54.93 ML/M2
LEFT VENTRICLE DIASTOLIC VOLUME: 92.28 ML
LEFT VENTRICLE MASS INDEX: 52 G/M2
LEFT VENTRICLE SYSTOLIC VOLUME INDEX: 19.7 ML/M2
LEFT VENTRICLE SYSTOLIC VOLUME: 33.13 ML
LEFT VENTRICULAR INTERNAL DIMENSION IN DIASTOLE: 4.5 CM (ref 3.5–6)
LEFT VENTRICULAR MASS: 87.12 G
LV LATERAL E/E' RATIO: 10.13 M/S
LV SEPTAL E/E' RATIO: 11.57 M/S
MV A" WAVE DURATION": 13.32 MSEC
MV PEAK A VEL: 0.82 M/S
MV PEAK E VEL: 0.81 M/S
MV STENOSIS PRESSURE HALF TIME: 64.11 MS
MV VALVE AREA P 1/2 METHOD: 3.43 CM2
NUC REST DIASTOLIC VOLUME INDEX: 58
NUC REST EJECTION FRACTION: 62
NUC REST SYSTOLIC VOLUME INDEX: 22
NUC STRESS DIASTOLIC VOLUME INDEX: 52
NUC STRESS EJECTION FRACTION: 54 %
NUC STRESS SYSTOLIC VOLUME INDEX: 24
OHS CV CPX 1 MINUTE RECOVERY HEART RATE: 131 BPM
OHS CV CPX 85 PERCENT MAX PREDICTED HEART RATE MALE: 128
OHS CV CPX ESTIMATED METS: 12
OHS CV CPX MAX PREDICTED HEART RATE: 151
OHS CV CPX PATIENT IS FEMALE: 1
OHS CV CPX PATIENT IS MALE: 0
OHS CV CPX PEAK DIASTOLIC BLOOD PRESSURE: 75 MMHG
OHS CV CPX PEAK HEAR RATE: 150 BPM
OHS CV CPX PEAK RATE PRESSURE PRODUCT: NORMAL
OHS CV CPX PEAK SYSTOLIC BLOOD PRESSURE: 157 MMHG
OHS CV CPX PERCENT MAX PREDICTED HEART RATE ACHIEVED: 100
OHS CV CPX RATE PRESSURE PRODUCT PRESENTING: 7923
PISA TR MAX VEL: 1.75 M/S
PULM VEIN S/D RATIO: 1.32
PV PEAK D VEL: 0.34 M/S
PV PEAK S VEL: 0.45 M/S
RA MAJOR: 3.93 CM
RA PRESSURE: 8 MMHG
RA WIDTH: 2.78 CM
RETIRED EF AND QEF - SEE NOTES: 53 %
RIGHT VENTRICULAR END-DIASTOLIC DIMENSION: 3.58 CM
RV TISSUE DOPPLER FREE WALL SYSTOLIC VELOCITY 1 (APICAL 4 CHAMBER VIEW): 11.13 CM/S
SINUS: 2.69 CM
STJ: 2.47 CM
STRESS ECHO POST EXERCISE DUR MIN: 7 MINUTES
STRESS ECHO POST EXERCISE DUR SEC: 2 SECONDS
SYSTOLIC BLOOD PRESSURE: 139 MMHG
TDI LATERAL: 0.08 M/S
TDI SEPTAL: 0.07 M/S
TDI: 0.08 M/S
TR MAX PG: 12 MMHG
TRICUSPID ANNULAR PLANE SYSTOLIC EXCURSION: 1.82 CM
TV REST PULMONARY ARTERY PRESSURE: 20 MMHG

## 2022-11-03 PROCEDURE — 78452 NUCLEAR STRESS - CARDIOLOGY INTERPRETED (CUPID ONLY): ICD-10-PCS | Mod: 26,,, | Performed by: INTERNAL MEDICINE

## 2022-11-03 PROCEDURE — 93306 TTE W/DOPPLER COMPLETE: CPT

## 2022-11-03 PROCEDURE — 78452 HT MUSCLE IMAGE SPECT MULT: CPT | Mod: 26,,, | Performed by: INTERNAL MEDICINE

## 2022-11-03 PROCEDURE — 93016 NUCLEAR STRESS - CARDIOLOGY INTERPRETED (CUPID ONLY): ICD-10-PCS | Mod: ,,, | Performed by: INTERNAL MEDICINE

## 2022-11-03 PROCEDURE — 93018 NUCLEAR STRESS - CARDIOLOGY INTERPRETED (CUPID ONLY): ICD-10-PCS | Mod: ,,, | Performed by: INTERNAL MEDICINE

## 2022-11-03 PROCEDURE — 93016 CV STRESS TEST SUPVJ ONLY: CPT | Mod: ,,, | Performed by: INTERNAL MEDICINE

## 2022-11-03 PROCEDURE — 93018 CV STRESS TEST I&R ONLY: CPT | Mod: ,,, | Performed by: INTERNAL MEDICINE

## 2022-11-03 PROCEDURE — 93306 ECHO (CUPID ONLY): ICD-10-PCS | Mod: 26,,, | Performed by: INTERNAL MEDICINE

## 2022-11-03 PROCEDURE — A9502 TC99M TETROFOSMIN: HCPCS

## 2022-11-03 PROCEDURE — 93306 TTE W/DOPPLER COMPLETE: CPT | Mod: 26,,, | Performed by: INTERNAL MEDICINE

## 2022-11-04 ENCOUNTER — DOCUMENTATION ONLY (OUTPATIENT)
Dept: CARDIOLOGY | Facility: CLINIC | Age: 62
End: 2022-11-04
Payer: COMMERCIAL

## 2022-11-04 ENCOUNTER — TELEPHONE (OUTPATIENT)
Dept: CARDIOLOGY | Facility: CLINIC | Age: 62
End: 2022-11-04
Payer: COMMERCIAL

## 2022-11-04 ENCOUNTER — PATIENT MESSAGE (OUTPATIENT)
Dept: CARDIOLOGY | Facility: CLINIC | Age: 62
End: 2022-11-04
Payer: COMMERCIAL

## 2022-11-04 DIAGNOSIS — R16.0 LIVER MASS: ICD-10-CM

## 2022-11-04 DIAGNOSIS — R16.0 LIVER MASS: Primary | ICD-10-CM

## 2022-11-04 NOTE — TELEPHONE ENCOUNTER
Spoke with pt, transferred to General surgery department to schedule appointment. Message left with Alma, pt navigator with Hepatology clinic regarding referral. Alma states message to be sent to staff and pt to receive call later today to schedule appointment. Pt notified, verbalized understanding.

## 2022-11-04 NOTE — TELEPHONE ENCOUNTER
----- Message from Barrera Sewell MD PhD sent at 11/4/2022 11:44 AM CDT -----   please schedule patient to be seen by Hepatology and General surgery.  Orders placed in epic.      Thank you

## 2022-11-04 NOTE — TELEPHONE ENCOUNTER
Spoke with pt.  Transferred pt to General surgery department to schedule appointment. Message left with Alma, pt navigator with hepatology clinic. Alma states pt to expect return call today to schedule appointment. Pt aware to expect telephone call.

## 2022-11-04 NOTE — PROGRESS NOTES
Echo done yesterday shows large mass in the liver measuring 4.7 x 5 cm.  Echo otherwise is within normal limits with normal LV systolic and diastolic function and no valve abnormalities.  Stress test done yesterday shows no evidence of ischemia.    Plan:  -refer to Hepatology in General surgery for further evaluation of the liver mass.  -hold off on starting atorvastatin until further evaluation of liver mass.  -results and plan discussed in detail  Peter, who voiced understanding.

## 2022-11-07 ENCOUNTER — PATIENT MESSAGE (OUTPATIENT)
Dept: CARDIOLOGY | Facility: CLINIC | Age: 62
End: 2022-11-07
Payer: COMMERCIAL

## 2022-11-07 ENCOUNTER — TELEPHONE (OUTPATIENT)
Dept: HEPATOLOGY | Facility: CLINIC | Age: 62
End: 2022-11-07
Payer: COMMERCIAL

## 2022-11-07 ENCOUNTER — TELEPHONE (OUTPATIENT)
Dept: HEPATOLOGY | Facility: HOSPITAL | Age: 62
End: 2022-11-07
Payer: COMMERCIAL

## 2022-11-07 ENCOUNTER — HOSPITAL ENCOUNTER (OUTPATIENT)
Dept: RADIOLOGY | Facility: HOSPITAL | Age: 62
Discharge: HOME | End: 2022-11-07
Attending: INTERNAL MEDICINE
Payer: COMMERCIAL

## 2022-11-07 DIAGNOSIS — R16.0 LIVER MASS: Primary | ICD-10-CM

## 2022-11-07 DIAGNOSIS — R16.0 LIVER MASS: ICD-10-CM

## 2022-11-07 PROCEDURE — 74183 MRI ABD W/O CNTR FLWD CNTR: CPT | Mod: TC

## 2022-11-07 PROCEDURE — 74183 MRI ABD W/O CNTR FLWD CNTR: CPT | Mod: 26,,, | Performed by: STUDENT IN AN ORGANIZED HEALTH CARE EDUCATION/TRAINING PROGRAM

## 2022-11-07 PROCEDURE — A9585 GADOBUTROL INJECTION: HCPCS | Performed by: INTERNAL MEDICINE

## 2022-11-07 PROCEDURE — 25500020 PHARM REV CODE 255: Performed by: INTERNAL MEDICINE

## 2022-11-07 PROCEDURE — 74183 MRI ABDOMEN W WO CONTRAST: ICD-10-PCS | Mod: 26,,, | Performed by: STUDENT IN AN ORGANIZED HEALTH CARE EDUCATION/TRAINING PROGRAM

## 2022-11-07 RX ORDER — GADOBUTROL 604.72 MG/ML
10 INJECTION INTRAVENOUS
Status: COMPLETED | OUTPATIENT
Start: 2022-11-07 | End: 2022-11-07

## 2022-11-07 RX ADMIN — GADOBUTROL 10 ML: 604.72 INJECTION INTRAVENOUS at 09:11

## 2022-11-07 NOTE — TELEPHONE ENCOUNTER
Called and spoke with Mrs. Estevezy and scheduled her for labs today , MRI tonight. And consult with Dr. Silverman for Wednesday at 130 11/9/2022

## 2022-11-08 ENCOUNTER — TELEPHONE (OUTPATIENT)
Dept: HEPATOLOGY | Facility: HOSPITAL | Age: 62
End: 2022-11-08
Payer: COMMERCIAL

## 2022-11-09 NOTE — TELEPHONE ENCOUNTER
Patient: Mallorie Green       MRN: [id number]      : 1960     Age: 62 y.o.  [address] [place] [id number]    Providers  Hepatologists: Mariaelena Silverman MD  Surgeons:  none  Radiologists:  none  Advanced Practice providers:     Priority of review: Other    Patient Transplant Status: Other no need for transplant    Reason for presentation: Indeterminate lesion    Clinical Summary: 62 year old female who had echo- liver mass noted. MRI done- cystic lesion. Pt not yet seen in hepatology clinic    Imaging to be reviewed: MRI abdo     HCC Treatment History: n/a    ABO:     Platelets:   Lab Results   Component Value Date/Time     2022 10:06 AM     Creatinine:   Lab Results   Component Value Date/Time    CREATININE 0.8 2022 10:06 AM    CREATININE 0.8 2022 10:06 AM     Bilirubin:   Lab Results   Component Value Date/Time    BILITOT 0.5 2022 10:06 AM     AFP Last 3 each if available:   Lab Results   Component Value Date/Time    AFP 5.4 2022 10:06 AM       MELD: MELD-Na score: 7 at 2022 10:06 AM  MELD score: 7 at 2022 10:06 AM  Calculated from:  Serum Creatinine: 0.8 mg/dL (Using min of 1 mg/dL) at 2022 10:06 AM  Serum Sodium: 142 mmol/L (Using max of 137 mmol/L) at 2022 10:06 AM  Total Bilirubin: 0.5 mg/dL (Using min of 1 mg/dL) at 2022 10:06 AM  INR(ratio): 1.1 at 2022 10:06 AM  Age: 62 years    Plan:     Follow-up Provider:

## 2022-11-10 ENCOUNTER — TELEPHONE (OUTPATIENT)
Dept: TRANSPLANT | Facility: CLINIC | Age: 62
End: 2022-11-10
Payer: COMMERCIAL

## 2022-11-10 NOTE — TELEPHONE ENCOUNTER
Submitted for review at liver conference  11/15/2022    ----- Message from Mariaelena Silveramn MD sent at 11/8/2022  9:50 PM CST -----  Please put on for radiology review next week

## 2022-11-15 ENCOUNTER — CONFERENCE (OUTPATIENT)
Dept: TRANSPLANT | Facility: CLINIC | Age: 62
End: 2022-11-15
Payer: COMMERCIAL

## 2022-11-15 NOTE — TELEPHONE ENCOUNTER
Patient: Mallorie Green       MRN: [id number]      : 1960     Age: 62 y.o.  [address] [place] [id number]    Providers  Hepatologists: Mariaelena Silverman MD  Surgeons:  none  Radiologists:  none  Advanced Practice providers:     Priority of review: Other    Patient Transplant Status: Other no need for transplant    Reason for presentation: Indeterminate lesion    Clinical Summary: 62 year old female who had echo- liver mass noted. MRI done- cystic lesion. Pt not yet seen in hepatology clinic    Imaging to be reviewed: MRI abdo     HCC Treatment History: n/a    ABO:     Platelets:   Lab Results   Component Value Date/Time     2022 10:06 AM     Creatinine:   Lab Results   Component Value Date/Time    CREATININE 0.8 2022 10:06 AM    CREATININE 0.8 2022 10:06 AM     Bilirubin:   Lab Results   Component Value Date/Time    BILITOT 0.5 2022 10:06 AM     AFP Last 3 each if available:   Lab Results   Component Value Date/Time    AFP 5.4 2022 10:06 AM       MELD: MELD-Na score: 7 at 2022 10:06 AM  MELD score: 7 at 2022 10:06 AM  Calculated from:  Serum Creatinine: 0.8 mg/dL (Using min of 1 mg/dL) at 2022 10:06 AM  Serum Sodium: 142 mmol/L (Using max of 137 mmol/L) at 2022 10:06 AM  Total Bilirubin: 0.5 mg/dL (Using min of 1 mg/dL) at 2022 10:06 AM  INR(ratio): 1.1 at 2022 10:06 AM  Age: 62 years    Discussion/Plan from Dr SHARITA Kingsley: 5.3cm minimally complex left hepatic lobe cyst without suspicious enhancement.  If the patient is asymptomatic, recommend repeat MRI in 6-12 months as suggested on MRI report.     Committee Discussion:  Patient has a minimally complex cyst.    Plan:  Next scan should be in 6-12 months..        Follow-up Provider: Dr Silverman.

## 2022-11-16 ENCOUNTER — OFFICE VISIT (OUTPATIENT)
Dept: HEPATOLOGY | Facility: CLINIC | Age: 62
End: 2022-11-16
Payer: COMMERCIAL

## 2022-11-16 ENCOUNTER — TELEPHONE (OUTPATIENT)
Dept: HEPATOLOGY | Facility: CLINIC | Age: 62
End: 2022-11-16
Payer: COMMERCIAL

## 2022-11-16 VITALS
OXYGEN SATURATION: 98 % | HEART RATE: 62 BPM | BODY MASS INDEX: 23.88 KG/M2 | SYSTOLIC BLOOD PRESSURE: 112 MMHG | HEIGHT: 64 IN | RESPIRATION RATE: 17 BRPM | TEMPERATURE: 99 F | DIASTOLIC BLOOD PRESSURE: 59 MMHG | WEIGHT: 139.88 LBS

## 2022-11-16 DIAGNOSIS — K76.89 LIVER CYST: Primary | ICD-10-CM

## 2022-11-16 DIAGNOSIS — R16.0 LIVER MASS: ICD-10-CM

## 2022-11-16 PROCEDURE — 3078F PR MOST RECENT DIASTOLIC BLOOD PRESSURE < 80 MM HG: ICD-10-PCS | Mod: CPTII,S$GLB,, | Performed by: INTERNAL MEDICINE

## 2022-11-16 PROCEDURE — 1160F PR REVIEW ALL MEDS BY PRESCRIBER/CLIN PHARMACIST DOCUMENTED: ICD-10-PCS | Mod: CPTII,S$GLB,, | Performed by: INTERNAL MEDICINE

## 2022-11-16 PROCEDURE — 1159F PR MEDICATION LIST DOCUMENTED IN MEDICAL RECORD: ICD-10-PCS | Mod: CPTII,S$GLB,, | Performed by: INTERNAL MEDICINE

## 2022-11-16 PROCEDURE — 99204 PR OFFICE/OUTPT VISIT, NEW, LEVL IV, 45-59 MIN: ICD-10-PCS | Mod: S$GLB,,, | Performed by: INTERNAL MEDICINE

## 2022-11-16 PROCEDURE — 1160F RVW MEDS BY RX/DR IN RCRD: CPT | Mod: CPTII,S$GLB,, | Performed by: INTERNAL MEDICINE

## 2022-11-16 PROCEDURE — 99999 PR PBB SHADOW E&M-EST. PATIENT-LVL V: ICD-10-PCS | Mod: PBBFAC,,, | Performed by: INTERNAL MEDICINE

## 2022-11-16 PROCEDURE — 99204 OFFICE O/P NEW MOD 45 MIN: CPT | Mod: S$GLB,,, | Performed by: INTERNAL MEDICINE

## 2022-11-16 PROCEDURE — 99999 PR PBB SHADOW E&M-EST. PATIENT-LVL V: CPT | Mod: PBBFAC,,, | Performed by: INTERNAL MEDICINE

## 2022-11-16 PROCEDURE — 3074F PR MOST RECENT SYSTOLIC BLOOD PRESSURE < 130 MM HG: ICD-10-PCS | Mod: CPTII,S$GLB,, | Performed by: INTERNAL MEDICINE

## 2022-11-16 PROCEDURE — 3008F PR BODY MASS INDEX (BMI) DOCUMENTED: ICD-10-PCS | Mod: CPTII,S$GLB,, | Performed by: INTERNAL MEDICINE

## 2022-11-16 PROCEDURE — 1159F MED LIST DOCD IN RCRD: CPT | Mod: CPTII,S$GLB,, | Performed by: INTERNAL MEDICINE

## 2022-11-16 PROCEDURE — 3078F DIAST BP <80 MM HG: CPT | Mod: CPTII,S$GLB,, | Performed by: INTERNAL MEDICINE

## 2022-11-16 PROCEDURE — 3074F SYST BP LT 130 MM HG: CPT | Mod: CPTII,S$GLB,, | Performed by: INTERNAL MEDICINE

## 2022-11-16 PROCEDURE — 3008F BODY MASS INDEX DOCD: CPT | Mod: CPTII,S$GLB,, | Performed by: INTERNAL MEDICINE

## 2022-11-16 RX ORDER — MONTELUKAST SODIUM 10 MG/1
TABLET ORAL
COMMUNITY

## 2022-11-16 NOTE — PROGRESS NOTES
HEPATOLOGY CONSULTATION    Referring Physician: Barrera Sewell MD  Current Corresponding Physician: Barrera Sewell MD, Jonathan West MD     Reason for Consultation: Consultation for evaluation of a liver cyst    History of Present Illness: Mallorie Green is a 62 y.o. female with a hx of breast cancer and a family hx of premature CAD who underwent an echo recently. Echo suggested a liver mass. Since then:    Labs  11/7/22: ALT 18, aST 20, ALKP 79, Tbil 0.5  Tumor markers: CA 19-9, AFP, CEA, , CA 15-3, CA 27.29 all negative    MRI w wo contrast 11/7/22: 5.3 cm complex cyst; 1 cm cyst; normal liver    The patient denies any symptoms of decompensated cirrhosis, including no ascites or edema, cognitive problems that would suggest hepatic encephalopathy, or GI bleeding from varices. Pt denies RUQ pain    Chief Complaint   Patient presents with    Abnormal Abdominal/Liver Imaging       Past Medical History:   Diagnosis Date    Cancer      Outpatient Encounter Medications as of 11/16/2022   Medication Sig Dispense Refill    montelukast (SINGULAIR) 10 mg tablet montelukast 10 mg tablet   TAKE 1 TABLET BY MOUTH EVERY DAY      atorvastatin (LIPITOR) 40 MG tablet Take 1 tablet (40 mg total) by mouth once daily. (Patient not taking: Reported on 11/16/2022) 90 tablet 3     No facility-administered encounter medications on file as of 11/16/2022.     Review of patient's allergies indicates:   Allergen Reactions    Codeine     Levaquin [levofloxacin]     Opioids - morphine analogues Nausea And Vomiting     History reviewed. No pertinent family history.    Social History     Socioeconomic History    Marital status:    Tobacco Use    Smoking status: Never    Smokeless tobacco: Never   Substance and Sexual Activity    Alcohol use: No    Drug use: No    Sexual activity: Never     Review of Systems   Constitutional: Negative.    HENT: Negative.     Eyes: Negative.    Respiratory: Negative.     Cardiovascular:  Negative.    Gastrointestinal: Negative.    Genitourinary: Negative.    Musculoskeletal: Negative.    Skin: Negative.    Neurological: Negative.    Psychiatric/Behavioral: Negative.     Vitals:    11/16/22 1328   BP: (!) 112/59   Pulse: 62   Resp: 17   Temp: 98.6 °F (37 °C)       Physical Exam  Vitals reviewed.   Constitutional:       Appearance: She is well-developed.   HENT:      Head: Normocephalic and atraumatic.   Eyes:      General: No scleral icterus.     Conjunctiva/sclera: Conjunctivae normal.      Pupils: Pupils are equal, round, and reactive to light.   Neck:      Thyroid: No thyromegaly.   Cardiovascular:      Rate and Rhythm: Normal rate and regular rhythm.      Heart sounds: Normal heart sounds.   Pulmonary:      Effort: Pulmonary effort is normal.      Breath sounds: Normal breath sounds. No rales.   Abdominal:      General: Bowel sounds are normal. There is no distension.      Palpations: Abdomen is soft. There is no mass.      Tenderness: There is no abdominal tenderness.   Musculoskeletal:         General: Normal range of motion.      Cervical back: Normal range of motion and neck supple.   Skin:     General: Skin is warm and dry.      Findings: No rash.   Neurological:      Mental Status: She is alert and oriented to person, place, and time.       MELD-Na score: 7 at 11/7/2022 10:06 AM  MELD score: 7 at 11/7/2022 10:06 AM  Calculated from:  Serum Creatinine: 0.8 mg/dL (Using min of 1 mg/dL) at 11/7/2022 10:06 AM  Serum Sodium: 142 mmol/L (Using max of 137 mmol/L) at 11/7/2022 10:06 AM  Total Bilirubin: 0.5 mg/dL (Using min of 1 mg/dL) at 11/7/2022 10:06 AM  INR(ratio): 1.1 at 11/7/2022 10:06 AM  Age: 62 years    Lab Results   Component Value Date    GLU 86 11/07/2022    BUN 19 11/07/2022    CREATININE 0.8 11/07/2022    CREATININE 0.8 11/07/2022    CALCIUM 9.8 11/07/2022     11/07/2022    K 3.8 11/07/2022     11/07/2022    PROT 7.0 11/07/2022    CO2 26 11/07/2022    ANIONGAP 8  11/07/2022    WBC 3.36 (L) 11/07/2022    RBC 4.55 11/07/2022    HGB 13.8 11/07/2022    HCT 41.3 11/07/2022    MCV 91 11/07/2022    MCH 30.3 11/07/2022    MCHC 33.4 11/07/2022     Lab Results   Component Value Date    RDW 12.1 11/07/2022     11/07/2022    MPV 10.0 11/07/2022    GRAN 1.9 11/07/2022    GRAN 56.5 11/07/2022    LYMPH 1.0 11/07/2022    LYMPH 30.1 11/07/2022    MONO 0.3 11/07/2022    MONO 8.9 11/07/2022    EOSINOPHIL 3.0 11/07/2022    BASOPHIL 1.2 11/07/2022    EOS 0.1 11/07/2022    BASO 0.04 11/07/2022    ALBUMIN 4.0 11/07/2022    AST 20 11/07/2022    ALT 18 11/07/2022    ALKPHOS 79 11/07/2022    LABPROT 10.9 11/07/2022    INR 1.1 11/07/2022       Assessment and Plan:  Mallorie Green is a 62 y.o. female with no underlygin chornic liver disease. She has a 5.3 cm complex cyst with no worrisome features. Because of the presence of septations, I am recommending a repeat MRI in 6 months. If stable may be able to follow it with serial abdo US. Will therefore check an abdo US in 6 months as well.    Return 6 months

## 2023-03-08 ENCOUNTER — LAB VISIT (OUTPATIENT)
Dept: LAB | Facility: HOSPITAL | Age: 63
End: 2023-03-08
Attending: INTERNAL MEDICINE
Payer: COMMERCIAL

## 2023-03-08 DIAGNOSIS — E78.2 MIXED HYPERLIPIDEMIA: ICD-10-CM

## 2023-03-08 LAB
ALBUMIN SERPL BCP-MCNC: 4 G/DL (ref 3.5–5.2)
ALP SERPL-CCNC: 84 U/L (ref 55–135)
ALT SERPL W/O P-5'-P-CCNC: 24 U/L (ref 10–44)
ANION GAP SERPL CALC-SCNC: 7 MMOL/L (ref 8–16)
AST SERPL-CCNC: 23 U/L (ref 10–40)
BILIRUB SERPL-MCNC: 0.5 MG/DL (ref 0.1–1)
BUN SERPL-MCNC: 19 MG/DL (ref 8–23)
CALCIUM SERPL-MCNC: 9.4 MG/DL (ref 8.7–10.5)
CHLORIDE SERPL-SCNC: 106 MMOL/L (ref 95–110)
CHOLEST SERPL-MCNC: 146 MG/DL (ref 120–199)
CHOLEST/HDLC SERPL: 2.6 {RATIO} (ref 2–5)
CO2 SERPL-SCNC: 29 MMOL/L (ref 23–29)
CREAT SERPL-MCNC: 0.8 MG/DL (ref 0.5–1.4)
EST. GFR  (NO RACE VARIABLE): >60 ML/MIN/1.73 M^2
GLUCOSE SERPL-MCNC: 88 MG/DL (ref 70–110)
HDLC SERPL-MCNC: 57 MG/DL (ref 40–75)
HDLC SERPL: 39 % (ref 20–50)
LDLC SERPL CALC-MCNC: 75 MG/DL (ref 63–159)
NONHDLC SERPL-MCNC: 89 MG/DL
POTASSIUM SERPL-SCNC: 4.4 MMOL/L (ref 3.5–5.1)
PROT SERPL-MCNC: 6.6 G/DL (ref 6–8.4)
SODIUM SERPL-SCNC: 142 MMOL/L (ref 136–145)
TRIGL SERPL-MCNC: 70 MG/DL (ref 30–150)

## 2023-03-08 PROCEDURE — 80061 LIPID PANEL: CPT | Performed by: INTERNAL MEDICINE

## 2023-03-08 PROCEDURE — 80053 COMPREHEN METABOLIC PANEL: CPT | Performed by: INTERNAL MEDICINE

## 2023-03-08 PROCEDURE — 36415 COLL VENOUS BLD VENIPUNCTURE: CPT | Performed by: INTERNAL MEDICINE

## 2023-03-15 ENCOUNTER — OFFICE VISIT (OUTPATIENT)
Dept: CARDIOLOGY | Facility: CLINIC | Age: 63
End: 2023-03-15
Payer: COMMERCIAL

## 2023-03-15 VITALS
HEIGHT: 64 IN | SYSTOLIC BLOOD PRESSURE: 117 MMHG | WEIGHT: 139.13 LBS | BODY MASS INDEX: 23.75 KG/M2 | DIASTOLIC BLOOD PRESSURE: 72 MMHG | HEART RATE: 54 BPM

## 2023-03-15 DIAGNOSIS — E78.2 MIXED HYPERLIPIDEMIA: ICD-10-CM

## 2023-03-15 DIAGNOSIS — K76.89 LIVER CYST: ICD-10-CM

## 2023-03-15 DIAGNOSIS — Z82.49 FAMILY HISTORY OF PREMATURE CAD: Primary | ICD-10-CM

## 2023-03-15 DIAGNOSIS — R07.89 OTHER CHEST PAIN: ICD-10-CM

## 2023-03-15 DIAGNOSIS — R53.82 CHRONIC FATIGUE: ICD-10-CM

## 2023-03-15 DIAGNOSIS — Z85.3 HISTORY OF BREAST CANCER: ICD-10-CM

## 2023-03-15 PROCEDURE — 99999 PR PBB SHADOW E&M-EST. PATIENT-LVL III: ICD-10-PCS | Mod: PBBFAC,,, | Performed by: INTERNAL MEDICINE

## 2023-03-15 PROCEDURE — 3078F PR MOST RECENT DIASTOLIC BLOOD PRESSURE < 80 MM HG: ICD-10-PCS | Mod: CPTII,S$GLB,, | Performed by: INTERNAL MEDICINE

## 2023-03-15 PROCEDURE — 1159F MED LIST DOCD IN RCRD: CPT | Mod: CPTII,S$GLB,, | Performed by: INTERNAL MEDICINE

## 2023-03-15 PROCEDURE — 99215 PR OFFICE/OUTPT VISIT, EST, LEVL V, 40-54 MIN: ICD-10-PCS | Mod: S$GLB,,, | Performed by: INTERNAL MEDICINE

## 2023-03-15 PROCEDURE — 3008F BODY MASS INDEX DOCD: CPT | Mod: CPTII,S$GLB,, | Performed by: INTERNAL MEDICINE

## 2023-03-15 PROCEDURE — 3074F SYST BP LT 130 MM HG: CPT | Mod: CPTII,S$GLB,, | Performed by: INTERNAL MEDICINE

## 2023-03-15 PROCEDURE — 1159F PR MEDICATION LIST DOCUMENTED IN MEDICAL RECORD: ICD-10-PCS | Mod: CPTII,S$GLB,, | Performed by: INTERNAL MEDICINE

## 2023-03-15 PROCEDURE — 99999 PR PBB SHADOW E&M-EST. PATIENT-LVL III: CPT | Mod: PBBFAC,,, | Performed by: INTERNAL MEDICINE

## 2023-03-15 PROCEDURE — 3078F DIAST BP <80 MM HG: CPT | Mod: CPTII,S$GLB,, | Performed by: INTERNAL MEDICINE

## 2023-03-15 PROCEDURE — 99215 OFFICE O/P EST HI 40 MIN: CPT | Mod: S$GLB,,, | Performed by: INTERNAL MEDICINE

## 2023-03-15 PROCEDURE — 3008F PR BODY MASS INDEX (BMI) DOCUMENTED: ICD-10-PCS | Mod: CPTII,S$GLB,, | Performed by: INTERNAL MEDICINE

## 2023-03-15 PROCEDURE — 3074F PR MOST RECENT SYSTOLIC BLOOD PRESSURE < 130 MM HG: ICD-10-PCS | Mod: CPTII,S$GLB,, | Performed by: INTERNAL MEDICINE

## 2023-03-15 NOTE — PROGRESS NOTES
"[facility]      Chief Complaint   Patient presents with    Follow-up    Hyperlipidemia       Patient ID: Mallorie Green is a 62 y.o. female with breast cancer s/p right mastectomy with radiation/chemotherapy (2000), HLD, who presents for an initial appointment.  Pertinent history/events are as follows:     -Pt presents to establish cardiac care.    -At our initial clinic visit on 11/2/2022, Mrs. Green reports chest pain localized at the left breast area (behind the clavicle) when she "gets upset".  Pain is described as "sharp", 6/10 in intensity, lasting a few seconds.  Episodes occur 2-3 times per week.  Last episode occurred last night.  Pt currently with no chest pain.  She reports worsening fatigue over the past 1 year.  No smoking history.  Reports family history of CAD with MI in mother at age 55.  Pt is a retired .  Outside labs from 9/17/2022 shows total cholesterol 230 with .  EKG today shows sinus bradycardia (rate 47 bpm) with no ischemic ST/T wave changes.   Plan:   Chest Pain/Fatigue- Pt with risk factors for CAD.  Check exercise nuclear stress test and echo to evaluate further.  HLD- ASCVD risk score is 4%.  Start atorvastatin 40 mg daily and ASA 81 mg daily.      11/4/2022: Echo done yesterday shows large mass in the liver measuring 4.7 x 5 cm.  Echo otherwise is within normal limits with normal LV systolic and diastolic function and no valve abnormalities.  Stress test done yesterday shows no evidence of ischemia.  Plan:  -refer to Hepatology in General surgery for further evaluation of the liver mass.  -hold off on starting atorvastatin until further evaluation of liver mass.  -results and plan discussed in detail Mrs. Green, who voiced understanding.    HPI:  Mrs. Green reports no significant chest pain, SOB, LE edema, TIA symptoms or syncope.  Echo on 11/3/2022 revealed a large mass in the liver measuring 4.7 x 5 cm in the liver consider further imaging with CT.  EF 65% " "with no significant valvular abnormalities.  Nuclear Stress Test on 11/3/2022 revealed normal myocardial perfusion with no evidence of myocardial ischemia or infarction.  LDL 75 on 3/8/2022.    Past Medical History:   Diagnosis Date    Cancer      Past Surgical History:   Procedure Laterality Date    BRAIN SURGERY      BREAST SURGERY       SECTION      CHOLECYSTECTOMY      HYSTERECTOMY       Social History     Socioeconomic History    Marital status:    Tobacco Use    Smoking status: Never    Smokeless tobacco: Never   Substance and Sexual Activity    Alcohol use: No    Drug use: No    Sexual activity: Never     No family history on file.    Review of patient's allergies indicates:   Allergen Reactions    Codeine     Levaquin [levofloxacin]     Opioids - morphine analogues Nausea And Vomiting           Review of Systems  Constitution: Denies chills, fever, and sweats.  HENT: Denies headaches or blurry vision.  Cardiovascular: Positive for chest pain.  Respiratory: Denies cough or shortness of breath.  Gastrointestinal: Denies abdominal pain, nausea, or vomiting.  Musculoskeletal: Denies muscle cramps.  Neurological: Denies dizziness or focal weakness.  Psychiatric/Behavioral: Normal mental status.  Hematologic/Lymphatic: Denies bleeding problem or easy bruising/bleeding.  Skin: Denies rash or suspicious lesions    Physical Examination  /72   Pulse (!) 54   Ht 5' 4" (1.626 m)   Wt 63.1 kg (139 lb 1.8 oz)   BMI 23.88 kg/m²     Constitutional: No acute distress, conversant  HEENT: Sclera anicteric, Pupils equal, round and reactive to light, extraocular motions intact, Oropharynx clear  Neck: No JVD, no carotid bruits  Cardiovascular: regular rate and rhythm, no murmur, rubs or gallops, normal S1/S2  Pulmonary: Clear to auscultation bilaterally  Abdominal: Abdomen soft, nontender, nondistended, positive bowel sounds  Extremities: No lower extremity edema,   Pulses:  Carotid pulses are 2+ on the " right side, and 2+ on the left side.  Radial pulses are 2+ on the right side, and 2+ on the left side.   Femoral pulses are 2+ on the right side, and 2+ on the left side.  Popliteal pulses are 2+ on the right side, and 2+ on the left side.   Dorsalis pedis pulses are 2+ on the right side, and 2+ on the left side.   Posterior tibial pulses are 2+ on the right side, and 2+ on the left side.    Skin: No ecchymosis, erythema, or ulcers  Psych: Alert and oriented x 3, appropriate affect  Neuro: CNII-XII intact, no focal deficits    Labs:  Most Recent Data  CBC:   Lab Results   Component Value Date    WBC 3.36 (L) 11/07/2022    HGB 13.8 11/07/2022    HCT 41.3 11/07/2022     11/07/2022    MCV 91 11/07/2022    RDW 12.1 11/07/2022     BMP:   Lab Results   Component Value Date     03/08/2023    K 4.4 03/08/2023     03/08/2023    CO2 29 03/08/2023    BUN 19 03/08/2023    CREATININE 0.8 03/08/2023    GLU 88 03/08/2023    CALCIUM 9.4 03/08/2023     LFTS;   Lab Results   Component Value Date    PROT 6.6 03/08/2023    ALBUMIN 4.0 03/08/2023    BILITOT 0.5 03/08/2023    AST 23 03/08/2023    ALKPHOS 84 03/08/2023    ALT 24 03/08/2023     COAGS:   Lab Results   Component Value Date    INR 1.1 11/07/2022     FLP:   Lab Results   Component Value Date    CHOL 146 03/08/2023    HDL 57 03/08/2023    LDLCALC 75.0 03/08/2023    TRIG 70 03/08/2023    CHOLHDL 39.0 03/08/2023     CARDIAC: No results found for: TROPONINI, CKMB, BNP    Imaging:    EKG 11/2/2022:  Sinus bradycardia (rate 47 bpm) with no ischemic ST/T wave changes    Echo 11/3/2022:  Large mass in the liver measuring 4.7 x 5 cm in the liver consider further imaging with CT  The estimated ejection fraction is 65%.  The left ventricle is normal in size with normal systolic function.  Normal left ventricular diastolic function.  Normal right ventricular size with normal right ventricular systolic function.  The estimated PA systolic pressure is 20  mmHg.  Intermediate central venous pressure (8 mmHg).    Nuclear Stress Test 11/3/2022:  Normal myocardial perfusion scan. There is no evidence of myocardial ischemia or infarction.    The gated perfusion images showed an ejection fraction of 62% at rest. The gated perfusion images showed an ejection fraction of 54% post stress. Normal ejection fraction is greater than 53%.    There is normal wall motion at rest and post stress.    LV cavity size is normal at rest and normal at stress.    The EKG portion of this study is negative for ischemia.    The patient reported no chest pain during the stress test.    There were no arrhythmias during stress.    There are no prior studies for comparison.    Assessment/Plan:  Mallorie Green is a 62 y.o. female with breast cancer s/p right mastectomy with radiation/chemotherapy (2000), HLD, who presents for an initial appointment.     Chest Pain/Fatigue- Pt with no significant chest pain or fatigue currently.  Echo on 11/3/2022 revealed a large mass in the liver measuring 4.7 x 5 cm in the liver consider further imaging with CT.  EF 65% with no significant valvular abnormalities.  Nuclear Stress Test on 11/3/2022 revealed normal myocardial perfusion with no evidence of myocardial ischemia or infarction.  Continue aggressive risk factor modification.      2. HLD- ASCVD risk score is 4%.   LDL 75 on 3/8/2022.  Continue atorvastatin 40 mg daily and ASA 81 mg daily.      Follow up in 6 months    Total duration of face to face visit time 45 minutes.  Total time spent counseling greater than fifty percent of total visit time.  Counseling included discussion regarding imaging findings, diagnosis, possibilities, treatment options, risks and benefits.  The patient had many questions regarding the options and long-term effects.    Barrera Sewell MD, PhD  Interventional Cardiology

## 2023-03-15 NOTE — PATIENT INSTRUCTIONS
Assessment/Plan:  Mallorie Green is a 62 y.o. female with breast cancer s/p right mastectomy with radiation/chemotherapy (2000), HLD, who presents for an initial appointment.     Chest Pain/Fatigue- Pt with no significant chest pain or fatigue currently.  Echo on 11/3/2022 revealed a large mass in the liver measuring 4.7 x 5 cm in the liver consider further imaging with CT.  EF 65% with no significant valvular abnormalities.  Nuclear Stress Test on 11/3/2022 revealed normal myocardial perfusion with no evidence of myocardial ischemia or infarction.  Continue aggressive risk factor modification.      2. HLD- ASCVD risk score is 4%.   LDL 75 on 3/8/2022.  Continue atorvastatin 40 mg daily and ASA 81 mg daily.      Follow up in 6 months

## 2023-05-16 ENCOUNTER — HOSPITAL ENCOUNTER (OUTPATIENT)
Dept: RADIOLOGY | Facility: HOSPITAL | Age: 63
Discharge: HOME | End: 2023-05-16
Attending: INTERNAL MEDICINE
Payer: COMMERCIAL

## 2023-05-16 DIAGNOSIS — K76.89 LIVER CYST: ICD-10-CM

## 2023-05-16 PROCEDURE — 76705 ECHO EXAM OF ABDOMEN: CPT | Mod: TC

## 2023-05-16 PROCEDURE — 76705 ECHO EXAM OF ABDOMEN: CPT | Mod: 26,,, | Performed by: STUDENT IN AN ORGANIZED HEALTH CARE EDUCATION/TRAINING PROGRAM

## 2023-05-16 PROCEDURE — 74183 MRI ABDOMEN W WO CONTRAST: ICD-10-PCS | Mod: 26,,, | Performed by: STUDENT IN AN ORGANIZED HEALTH CARE EDUCATION/TRAINING PROGRAM

## 2023-05-16 PROCEDURE — 74183 MRI ABD W/O CNTR FLWD CNTR: CPT | Mod: TC

## 2023-05-16 PROCEDURE — 74183 MRI ABD W/O CNTR FLWD CNTR: CPT | Mod: 26,,, | Performed by: STUDENT IN AN ORGANIZED HEALTH CARE EDUCATION/TRAINING PROGRAM

## 2023-05-16 PROCEDURE — A9585 GADOBUTROL INJECTION: HCPCS | Performed by: INTERNAL MEDICINE

## 2023-05-16 PROCEDURE — 25500020 PHARM REV CODE 255: Performed by: INTERNAL MEDICINE

## 2023-05-16 PROCEDURE — 76705 US ABDOMEN LIMITED: ICD-10-PCS | Mod: 26,,, | Performed by: STUDENT IN AN ORGANIZED HEALTH CARE EDUCATION/TRAINING PROGRAM

## 2023-05-16 RX ORDER — GADOBUTROL 604.72 MG/ML
10 INJECTION INTRAVENOUS
Status: COMPLETED | OUTPATIENT
Start: 2023-05-16 | End: 2023-05-16

## 2023-05-16 RX ADMIN — GADOBUTROL 10 ML: 604.72 INJECTION INTRAVENOUS at 07:05

## 2023-05-22 ENCOUNTER — OFFICE VISIT (OUTPATIENT)
Dept: HEPATOLOGY | Facility: CLINIC | Age: 63
End: 2023-05-22
Payer: COMMERCIAL

## 2023-05-22 VITALS
BODY MASS INDEX: 23.92 KG/M2 | OXYGEN SATURATION: 100 % | WEIGHT: 140.13 LBS | RESPIRATION RATE: 17 BRPM | DIASTOLIC BLOOD PRESSURE: 58 MMHG | TEMPERATURE: 99 F | SYSTOLIC BLOOD PRESSURE: 121 MMHG | HEIGHT: 64 IN | HEART RATE: 56 BPM

## 2023-05-22 DIAGNOSIS — K76.89 LIVER CYST: Primary | ICD-10-CM

## 2023-05-22 PROCEDURE — 1160F RVW MEDS BY RX/DR IN RCRD: CPT | Mod: CPTII,S$GLB,, | Performed by: INTERNAL MEDICINE

## 2023-05-22 PROCEDURE — 3008F BODY MASS INDEX DOCD: CPT | Mod: CPTII,S$GLB,, | Performed by: INTERNAL MEDICINE

## 2023-05-22 PROCEDURE — 3008F PR BODY MASS INDEX (BMI) DOCUMENTED: ICD-10-PCS | Mod: CPTII,S$GLB,, | Performed by: INTERNAL MEDICINE

## 2023-05-22 PROCEDURE — 1159F MED LIST DOCD IN RCRD: CPT | Mod: CPTII,S$GLB,, | Performed by: INTERNAL MEDICINE

## 2023-05-22 PROCEDURE — 3074F SYST BP LT 130 MM HG: CPT | Mod: CPTII,S$GLB,, | Performed by: INTERNAL MEDICINE

## 2023-05-22 PROCEDURE — 99999 PR PBB SHADOW E&M-EST. PATIENT-LVL IV: ICD-10-PCS | Mod: PBBFAC,,, | Performed by: INTERNAL MEDICINE

## 2023-05-22 PROCEDURE — 99213 OFFICE O/P EST LOW 20 MIN: CPT | Mod: S$GLB,,, | Performed by: INTERNAL MEDICINE

## 2023-05-22 PROCEDURE — 3078F DIAST BP <80 MM HG: CPT | Mod: CPTII,S$GLB,, | Performed by: INTERNAL MEDICINE

## 2023-05-22 PROCEDURE — 1160F PR REVIEW ALL MEDS BY PRESCRIBER/CLIN PHARMACIST DOCUMENTED: ICD-10-PCS | Mod: CPTII,S$GLB,, | Performed by: INTERNAL MEDICINE

## 2023-05-22 PROCEDURE — 3074F PR MOST RECENT SYSTOLIC BLOOD PRESSURE < 130 MM HG: ICD-10-PCS | Mod: CPTII,S$GLB,, | Performed by: INTERNAL MEDICINE

## 2023-05-22 PROCEDURE — 99213 PR OFFICE/OUTPT VISIT, EST, LEVL III, 20-29 MIN: ICD-10-PCS | Mod: S$GLB,,, | Performed by: INTERNAL MEDICINE

## 2023-05-22 PROCEDURE — 1159F PR MEDICATION LIST DOCUMENTED IN MEDICAL RECORD: ICD-10-PCS | Mod: CPTII,S$GLB,, | Performed by: INTERNAL MEDICINE

## 2023-05-22 PROCEDURE — 99999 PR PBB SHADOW E&M-EST. PATIENT-LVL IV: CPT | Mod: PBBFAC,,, | Performed by: INTERNAL MEDICINE

## 2023-05-22 PROCEDURE — 3078F PR MOST RECENT DIASTOLIC BLOOD PRESSURE < 80 MM HG: ICD-10-PCS | Mod: CPTII,S$GLB,, | Performed by: INTERNAL MEDICINE

## 2023-05-22 RX ORDER — ASPIRIN 81 MG/1
81 TABLET ORAL DAILY
COMMUNITY

## 2023-05-22 NOTE — PROGRESS NOTES
HEPATOLOGY FOLLOW UP    Referring Physician: Barrera Sewell MD  Current Corresponding Physician: Barrera Sewell MD, Jonathan West MD     Mallorie Green is here for follow up of Liver cyst      HPI  Mallorie Green is a 62 y.o. female with a hx of breast cancer and a family hx of premature CAD who underwent an echo recently. Echo suggested a liver mass. I saw her in consultation 11/16/22. Since then:     Labs  11/7/22: ALT 18, aST 20, ALKP 79, Tbil 0.5  Tumor markers: CA 19-9, AFP, CEA, , CA 15-3, CA 27.29 all negative     MRI w wo contrast 11/7/22: 5.3 cm complex cyst; 1 cm cyst; normal liver     The patient denied any symptoms of decompensated cirrhosis, including no ascites or edema, cognitive problems that would suggest hepatic encephalopathy, or GI bleeding from varices. Pt denies RUQ pain    Interval History  Since Mallorie Green's last visit:    Feeling well. Denies N/V, abdo pain or diarrhea.    MRI abdo w wo contrast 5/16/23: Slightly decreased size of a T2 hyperintense left hepatic lobe lesion measuring 4.8 x 3.7 cm (previously 5.3 x 4.2 cm) containing intrinsic T1 hyperintensity.  No suspicious enhancement or restricted diffusion.  Few additional subcentimeter simple cysts within the left and right lobes.   Abdo US 5/16/23: Liver: Normal in size, measuring 14.5 cm. Homogeneous echotexture.  Left hepatic lobe complex cystic lesion measuring 4.4 x 3.5 x 5.3 cm (previously 4.2 x 5.3 x 4.6 cm on MRI from 11/07/2022) containing internal echogenic material.  No internal vascularity.  HRI measures 0.9.    Outpatient Encounter Medications as of 5/22/2023   Medication Sig Dispense Refill    aspirin (ECOTRIN) 81 MG EC tablet Take 81 mg by mouth once daily.      atorvastatin (LIPITOR) 40 MG tablet Take 1 tablet (40 mg total) by mouth once daily. 90 tablet 3    montelukast (SINGULAIR) 10 mg tablet montelukast 10 mg tablet   TAKE 1 TABLET BY MOUTH EVERY DAY       No facility-administered encounter  medications on file as of 5/22/2023.     Review of patient's allergies indicates:   Allergen Reactions    Codeine     Levaquin [levofloxacin]     Opioids - morphine analogues Nausea And Vomiting     Past Medical History:   Diagnosis Date    Cancer        Review of Systems   Constitutional: Negative.    HENT: Negative.     Eyes: Negative.    Respiratory: Negative.     Cardiovascular: Negative.    Gastrointestinal: Negative.    Genitourinary: Negative.    Musculoskeletal: Negative.    Skin: Negative.    Neurological: Negative.    Psychiatric/Behavioral: Negative.     Vitals:    05/22/23 0841   BP: (!) 121/58   Pulse: (!) 56   Resp: 17   Temp: 98.7 °F (37.1 °C)       Physical Exam  Vitals reviewed.   Constitutional:       Appearance: She is well-developed.   HENT:      Head: Normocephalic and atraumatic.   Eyes:      General: No scleral icterus.     Conjunctiva/sclera: Conjunctivae normal.      Pupils: Pupils are equal, round, and reactive to light.   Neck:      Thyroid: No thyromegaly.   Cardiovascular:      Rate and Rhythm: Normal rate and regular rhythm.      Heart sounds: Normal heart sounds.   Pulmonary:      Effort: Pulmonary effort is normal.      Breath sounds: Normal breath sounds. No rales.   Abdominal:      General: Bowel sounds are normal. There is no distension.      Palpations: Abdomen is soft. There is no mass.      Tenderness: There is no abdominal tenderness.   Musculoskeletal:         General: Normal range of motion.      Cervical back: Normal range of motion and neck supple.   Skin:     General: Skin is warm and dry.      Findings: No rash.   Neurological:      Mental Status: She is alert and oriented to person, place, and time.       MELD-Na: 7 at 11/7/2022 10:06 AM  MELD: 7 at 11/7/2022 10:06 AM  Calculated from:  Serum Creatinine: 0.8 mg/dL (Using min of 1 mg/dL) at 11/7/2022 10:06 AM  Serum Sodium: 142 mmol/L (Using max of 137 mmol/L) at 11/7/2022 10:06 AM  Total Bilirubin: 0.5 mg/dL (Using min  of 1 mg/dL) at 11/7/2022 10:06 AM  INR(ratio): 1.1 at 11/7/2022 10:06 AM    Lab Results   Component Value Date    GLU 88 03/08/2023    BUN 19 03/08/2023    CREATININE 0.8 03/08/2023    CALCIUM 9.4 03/08/2023     03/08/2023    K 4.4 03/08/2023     03/08/2023    PROT 6.6 03/08/2023    CO2 29 03/08/2023    ANIONGAP 7 (L) 03/08/2023    WBC 3.36 (L) 11/07/2022    RBC 4.55 11/07/2022    HGB 13.8 11/07/2022    HCT 41.3 11/07/2022    MCV 91 11/07/2022    MCH 30.3 11/07/2022    MCHC 33.4 11/07/2022     Lab Results   Component Value Date    RDW 12.1 11/07/2022     11/07/2022    MPV 10.0 11/07/2022    GRAN 1.9 11/07/2022    GRAN 56.5 11/07/2022    LYMPH 1.0 11/07/2022    LYMPH 30.1 11/07/2022    MONO 0.3 11/07/2022    MONO 8.9 11/07/2022    EOSINOPHIL 3.0 11/07/2022    BASOPHIL 1.2 11/07/2022    EOS 0.1 11/07/2022    BASO 0.04 11/07/2022    CHOL 146 03/08/2023    TRIG 70 03/08/2023    HDL 57 03/08/2023    CHOLHDL 39.0 03/08/2023    TOTALCHOLEST 2.6 03/08/2023    ALBUMIN 4.0 03/08/2023    AST 23 03/08/2023    ALT 24 03/08/2023    ALKPHOS 84 03/08/2023    LABPROT 10.9 11/07/2022    INR 1.1 11/07/2022       Assessment and Plan:  Mallorie Green is a 62 y.o. female with a complex liver cyst. It is stable/slgihtly smaller in size. No worrisome features. Pt is asymptomatic. It can be seen on abdo US. Recommend continued surveillance with repeat US every 6 months.    Return 6 months

## 2023-06-07 ENCOUNTER — TELEPHONE (OUTPATIENT)
Dept: HEPATOLOGY | Facility: CLINIC | Age: 63
End: 2023-06-07
Payer: COMMERCIAL

## 2023-06-07 NOTE — TELEPHONE ENCOUNTER
Called and scheduled to follow up in November ----- Message from Andre Owens sent at 6/7/2023 12:01 PM CDT -----  Regarding: call back  Pt joanna Palm call    Call

## 2023-09-15 ENCOUNTER — OFFICE VISIT (OUTPATIENT)
Dept: CARDIOLOGY | Facility: CLINIC | Age: 63
End: 2023-09-15
Payer: COMMERCIAL

## 2023-09-15 VITALS
HEIGHT: 64 IN | DIASTOLIC BLOOD PRESSURE: 64 MMHG | SYSTOLIC BLOOD PRESSURE: 110 MMHG | WEIGHT: 140 LBS | HEART RATE: 72 BPM | BODY MASS INDEX: 23.9 KG/M2

## 2023-09-15 DIAGNOSIS — R07.89 OTHER CHEST PAIN: ICD-10-CM

## 2023-09-15 DIAGNOSIS — R53.82 CHRONIC FATIGUE: ICD-10-CM

## 2023-09-15 DIAGNOSIS — E78.2 MIXED HYPERLIPIDEMIA: Primary | ICD-10-CM

## 2023-09-15 DIAGNOSIS — K76.89 LIVER CYST: ICD-10-CM

## 2023-09-15 DIAGNOSIS — Z85.3 HISTORY OF BREAST CANCER: ICD-10-CM

## 2023-09-15 DIAGNOSIS — Z82.49 FAMILY HISTORY OF PREMATURE CAD: ICD-10-CM

## 2023-09-15 PROCEDURE — 3008F BODY MASS INDEX DOCD: CPT | Mod: CPTII,S$GLB,, | Performed by: INTERNAL MEDICINE

## 2023-09-15 PROCEDURE — 99999 PR PBB SHADOW E&M-EST. PATIENT-LVL III: CPT | Mod: PBBFAC,,, | Performed by: INTERNAL MEDICINE

## 2023-09-15 PROCEDURE — 99999 PR PBB SHADOW E&M-EST. PATIENT-LVL III: ICD-10-PCS | Mod: PBBFAC,,, | Performed by: INTERNAL MEDICINE

## 2023-09-15 PROCEDURE — 99215 OFFICE O/P EST HI 40 MIN: CPT | Mod: S$GLB,,, | Performed by: INTERNAL MEDICINE

## 2023-09-15 PROCEDURE — 3078F DIAST BP <80 MM HG: CPT | Mod: CPTII,S$GLB,, | Performed by: INTERNAL MEDICINE

## 2023-09-15 PROCEDURE — 3074F PR MOST RECENT SYSTOLIC BLOOD PRESSURE < 130 MM HG: ICD-10-PCS | Mod: CPTII,S$GLB,, | Performed by: INTERNAL MEDICINE

## 2023-09-15 PROCEDURE — 1159F PR MEDICATION LIST DOCUMENTED IN MEDICAL RECORD: ICD-10-PCS | Mod: CPTII,S$GLB,, | Performed by: INTERNAL MEDICINE

## 2023-09-15 PROCEDURE — 99215 PR OFFICE/OUTPT VISIT, EST, LEVL V, 40-54 MIN: ICD-10-PCS | Mod: S$GLB,,, | Performed by: INTERNAL MEDICINE

## 2023-09-15 PROCEDURE — 3078F PR MOST RECENT DIASTOLIC BLOOD PRESSURE < 80 MM HG: ICD-10-PCS | Mod: CPTII,S$GLB,, | Performed by: INTERNAL MEDICINE

## 2023-09-15 PROCEDURE — 3008F PR BODY MASS INDEX (BMI) DOCUMENTED: ICD-10-PCS | Mod: CPTII,S$GLB,, | Performed by: INTERNAL MEDICINE

## 2023-09-15 PROCEDURE — 3074F SYST BP LT 130 MM HG: CPT | Mod: CPTII,S$GLB,, | Performed by: INTERNAL MEDICINE

## 2023-09-15 PROCEDURE — 1159F MED LIST DOCD IN RCRD: CPT | Mod: CPTII,S$GLB,, | Performed by: INTERNAL MEDICINE

## 2023-09-15 NOTE — PROGRESS NOTES
"[facility]      Chief Complaint   Patient presents with    mixed hyperlipidemia       Patient ID: Mallorie Green is a 62 y.o. female with breast cancer s/p right mastectomy with radiation/chemotherapy (2000), HLD, who presents for an initial appointment.  Pertinent history/events are as follows:     -Pt presents to establish cardiac care.    -At our initial clinic visit on 11/2/2022, Mrs. Green reports chest pain localized at the left breast area (behind the clavicle) when she "gets upset".  Pain is described as "sharp", 6/10 in intensity, lasting a few seconds.  Episodes occur 2-3 times per week.  Last episode occurred last night.  Pt currently with no chest pain.  She reports worsening fatigue over the past 1 year.  No smoking history.  Reports family history of CAD with MI in mother at age 55.  Pt is a retired .  Outside labs from 9/17/2022 shows total cholesterol 230 with .  EKG today shows sinus bradycardia (rate 47 bpm) with no ischemic ST/T wave changes.   Plan:   Chest Pain/Fatigue- Pt with risk factors for CAD.  Check exercise nuclear stress test and echo to evaluate further.  HLD- ASCVD risk score is 4%.  Start atorvastatin 40 mg daily and ASA 81 mg daily.      11/4/2022: Echo done yesterday shows large mass in the liver measuring 4.7 x 5 cm.  Echo otherwise is within normal limits with normal LV systolic and diastolic function and no valve abnormalities.  Stress test done yesterday shows no evidence of ischemia.  Plan:  -refer to Hepatology in General surgery for further evaluation of the liver mass.  -hold off on starting atorvastatin until further evaluation of liver mass.  -results and plan discussed in detail Mrs. Green, who voiced understanding.    -At follow up clinic visit on 3/15/2023, Mrs. Green reported no significant chest pain, SOB, LE edema, TIA symptoms or syncope.  Echo on 11/3/2022 revealed a large mass in the liver measuring 4.7 x 5 cm in the liver consider " further imaging with CT.  EF 65% with no significant valvular abnormalities.  Nuclear Stress Test on 11/3/2022 revealed normal myocardial perfusion with no evidence of myocardial ischemia or infarction.  LDL 75 on 3/8/2022.  Plan:   Chest Pain/Fatigue- Pt with no significant chest pain or fatigue currently.  Echo on 11/3/2022 revealed a large mass in the liver measuring 4.7 x 5 cm in the liver consider further imaging with CT.  EF 65% with no significant valvular abnormalities.  Nuclear Stress Test on 11/3/2022 revealed normal myocardial perfusion with no evidence of myocardial ischemia or infarction.  Continue aggressive risk factor modification.    HLD- ASCVD risk score is 4%.   LDL 75 on 3/8/2022.  Continue atorvastatin 40 mg daily and ASA 81 mg daily.    HPI:  Mrs. Green reports doing well with no chest pain, SOB, LE edema, TIA symptoms or syncope.  LDL 75  with LFT's within normal limits on 3/8/2023.      Past Medical History:   Diagnosis Date    Cancer      Past Surgical History:   Procedure Laterality Date    BRAIN SURGERY      BREAST SURGERY       SECTION      CHOLECYSTECTOMY      HYSTERECTOMY       Social History     Socioeconomic History    Marital status:    Tobacco Use    Smoking status: Never    Smokeless tobacco: Never   Substance and Sexual Activity    Alcohol use: No    Drug use: No    Sexual activity: Never     No family history on file.    Review of patient's allergies indicates:   Allergen Reactions    Codeine     Levaquin [levofloxacin]     Opioids - morphine analogues Nausea And Vomiting           Review of Systems  Constitution: Denies chills, fever, and sweats.  HENT: Denies headaches or blurry vision.  Cardiovascular: Positive for chest pain.  Respiratory: Denies cough or shortness of breath.  Gastrointestinal: Denies abdominal pain, nausea, or vomiting.  Musculoskeletal: Denies muscle cramps.  Neurological: Denies dizziness or focal weakness.  Psychiatric/Behavioral: Normal  "mental status.  Hematologic/Lymphatic: Denies bleeding problem or easy bruising/bleeding.  Skin: Denies rash or suspicious lesions    Physical Examination  /64   Pulse 72   Ht 5' 4" (1.626 m)   Wt 63.5 kg (139 lb 15.9 oz)   BMI 24.03 kg/m²     Constitutional: No acute distress, conversant  HEENT: Sclera anicteric, Pupils equal, round and reactive to light, extraocular motions intact, Oropharynx clear  Neck: No JVD, no carotid bruits  Cardiovascular: regular rate and rhythm, no murmur, rubs or gallops, normal S1/S2  Pulmonary: Clear to auscultation bilaterally  Abdominal: Abdomen soft, nontender, nondistended, positive bowel sounds  Extremities: No lower extremity edema,   Pulses:  Carotid pulses are 2+ on the right side, and 2+ on the left side.  Radial pulses are 2+ on the right side, and 2+ on the left side.   Femoral pulses are 2+ on the right side, and 2+ on the left side.  Popliteal pulses are 2+ on the right side, and 2+ on the left side.   Dorsalis pedis pulses are 2+ on the right side, and 2+ on the left side.   Posterior tibial pulses are 2+ on the right side, and 2+ on the left side.    Skin: No ecchymosis, erythema, or ulcers  Psych: Alert and oriented x 3, appropriate affect  Neuro: CNII-XII intact, no focal deficits    Labs:  Most Recent Data  CBC:   Lab Results   Component Value Date    WBC 3.36 (L) 11/07/2022    HGB 13.8 11/07/2022    HCT 41.3 11/07/2022     11/07/2022    MCV 91 11/07/2022    RDW 12.1 11/07/2022     BMP:   Lab Results   Component Value Date     03/08/2023    K 4.4 03/08/2023     03/08/2023    CO2 29 03/08/2023    BUN 19 03/08/2023    CREATININE 0.8 03/08/2023    GLU 88 03/08/2023    CALCIUM 9.4 03/08/2023     LFTS;   Lab Results   Component Value Date    PROT 6.6 03/08/2023    ALBUMIN 4.0 03/08/2023    BILITOT 0.5 03/08/2023    AST 23 03/08/2023    ALKPHOS 84 03/08/2023    ALT 24 03/08/2023     COAGS:   Lab Results   Component Value Date    INR 1.1 " "11/07/2022     FLP:   Lab Results   Component Value Date    CHOL 146 03/08/2023    HDL 57 03/08/2023    LDLCALC 75.0 03/08/2023    TRIG 70 03/08/2023    CHOLHDL 39.0 03/08/2023     CARDIAC: No results found for: "TROPONINI", "CKTOTAL", "CKMB", "BNP"    Imaging:    EKG 11/2/2022:  Sinus bradycardia (rate 47 bpm) with no ischemic ST/T wave changes    Echo 11/3/2022:  Large mass in the liver measuring 4.7 x 5 cm in the liver consider further imaging with CT  The estimated ejection fraction is 65%.  The left ventricle is normal in size with normal systolic function.  Normal left ventricular diastolic function.  Normal right ventricular size with normal right ventricular systolic function.  The estimated PA systolic pressure is 20 mmHg.  Intermediate central venous pressure (8 mmHg).    Nuclear Stress Test 11/3/2022:  Normal myocardial perfusion scan. There is no evidence of myocardial ischemia or infarction.    The gated perfusion images showed an ejection fraction of 62% at rest. The gated perfusion images showed an ejection fraction of 54% post stress. Normal ejection fraction is greater than 53%.    There is normal wall motion at rest and post stress.    LV cavity size is normal at rest and normal at stress.    The EKG portion of this study is negative for ischemia.    The patient reported no chest pain during the stress test.    There were no arrhythmias during stress.    There are no prior studies for comparison.    Assessment/Plan:  Mallorie Green is a 62 y.o. female with breast cancer s/p right mastectomy with radiation/chemotherapy (2000), HLD, who presents for an initial appointment.     HLD- ASCVD risk score is 4%.  LDL 75  with LFT's within normal limits on 3/8/2023.  Continue atorvastatin 40 mg daily and ASA 81 mg daily.      Follow up in 6 months    Total duration of face to face visit time 45 minutes.  Total time spent counseling greater than fifty percent of total visit time.  Counseling included " discussion regarding imaging findings, diagnosis, possibilities, treatment options, risks and benefits.  The patient had many questions regarding the options and long-term effects.    Barrera Sewell MD, PhD  Interventional Cardiology

## 2023-09-15 NOTE — PATIENT INSTRUCTIONS
Assessment/Plan:  Mallorie Green is a 62 y.o. female with breast cancer s/p right mastectomy with radiation/chemotherapy (2000), HLD, who presents for an initial appointment.     HLD- ASCVD risk score is 4%.  LDL 75  with LFT's within normal limits on 3/8/2023.  Continue atorvastatin 40 mg daily and ASA 81 mg daily.      Follow up in 6 months

## 2023-09-18 ENCOUNTER — LAB VISIT (OUTPATIENT)
Dept: LAB | Facility: HOSPITAL | Age: 63
End: 2023-09-18
Attending: INTERNAL MEDICINE
Payer: COMMERCIAL

## 2023-09-18 DIAGNOSIS — E78.2 MIXED HYPERLIPIDEMIA: ICD-10-CM

## 2023-09-18 LAB
ALBUMIN SERPL BCP-MCNC: 3.9 G/DL (ref 3.5–5.2)
ALP SERPL-CCNC: 82 U/L (ref 55–135)
ALT SERPL W/O P-5'-P-CCNC: 27 U/L (ref 10–44)
ANION GAP SERPL CALC-SCNC: 6 MMOL/L (ref 8–16)
AST SERPL-CCNC: 28 U/L (ref 10–40)
BILIRUB SERPL-MCNC: 0.7 MG/DL (ref 0.1–1)
BUN SERPL-MCNC: 16 MG/DL (ref 8–23)
CALCIUM SERPL-MCNC: 9.5 MG/DL (ref 8.7–10.5)
CHLORIDE SERPL-SCNC: 105 MMOL/L (ref 95–110)
CHOLEST SERPL-MCNC: 143 MG/DL (ref 120–199)
CHOLEST/HDLC SERPL: 2.3 {RATIO} (ref 2–5)
CO2 SERPL-SCNC: 30 MMOL/L (ref 23–29)
CREAT SERPL-MCNC: 0.8 MG/DL (ref 0.5–1.4)
EST. GFR  (NO RACE VARIABLE): >60 ML/MIN/1.73 M^2
GLUCOSE SERPL-MCNC: 88 MG/DL (ref 70–110)
HDLC SERPL-MCNC: 61 MG/DL (ref 40–75)
HDLC SERPL: 42.7 % (ref 20–50)
LDLC SERPL CALC-MCNC: 72.8 MG/DL (ref 63–159)
NONHDLC SERPL-MCNC: 82 MG/DL
POTASSIUM SERPL-SCNC: 4.1 MMOL/L (ref 3.5–5.1)
PROT SERPL-MCNC: 6.7 G/DL (ref 6–8.4)
SODIUM SERPL-SCNC: 141 MMOL/L (ref 136–145)
TRIGL SERPL-MCNC: 46 MG/DL (ref 30–150)

## 2023-09-18 PROCEDURE — 80061 LIPID PANEL: CPT | Performed by: INTERNAL MEDICINE

## 2023-09-18 PROCEDURE — 80053 COMPREHEN METABOLIC PANEL: CPT | Performed by: INTERNAL MEDICINE

## 2023-09-18 PROCEDURE — 36415 COLL VENOUS BLD VENIPUNCTURE: CPT | Performed by: INTERNAL MEDICINE

## 2023-11-13 ENCOUNTER — PATIENT MESSAGE (OUTPATIENT)
Dept: CARDIOLOGY | Facility: CLINIC | Age: 63
End: 2023-11-13
Payer: COMMERCIAL

## 2023-11-13 RX ORDER — ATORVASTATIN CALCIUM 40 MG/1
40 TABLET, FILM COATED ORAL DAILY
Qty: 90 TABLET | Refills: 3 | Status: SHIPPED | OUTPATIENT
Start: 2023-11-13 | End: 2024-11-12

## 2023-11-15 ENCOUNTER — HOSPITAL ENCOUNTER (OUTPATIENT)
Dept: RADIOLOGY | Facility: HOSPITAL | Age: 63
Discharge: HOME | End: 2023-11-15
Attending: INTERNAL MEDICINE
Payer: COMMERCIAL

## 2023-11-15 DIAGNOSIS — K76.89 LIVER CYST: ICD-10-CM

## 2023-11-15 PROCEDURE — 76705 US ABDOMEN LIMITED: ICD-10-PCS | Mod: 26,,, | Performed by: RADIOLOGY

## 2023-11-15 PROCEDURE — 76705 ECHO EXAM OF ABDOMEN: CPT | Mod: 26,,, | Performed by: RADIOLOGY

## 2023-11-15 PROCEDURE — 76705 ECHO EXAM OF ABDOMEN: CPT | Mod: TC

## 2023-11-27 ENCOUNTER — OFFICE VISIT (OUTPATIENT)
Dept: HEPATOLOGY | Facility: CLINIC | Age: 63
End: 2023-11-27
Payer: COMMERCIAL

## 2023-11-27 VITALS
DIASTOLIC BLOOD PRESSURE: 60 MMHG | BODY MASS INDEX: 23.14 KG/M2 | RESPIRATION RATE: 18 BRPM | HEIGHT: 64 IN | WEIGHT: 135.56 LBS | HEART RATE: 54 BPM | SYSTOLIC BLOOD PRESSURE: 141 MMHG | OXYGEN SATURATION: 99 %

## 2023-11-27 DIAGNOSIS — K76.89 LIVER CYST: Primary | ICD-10-CM

## 2023-11-27 DIAGNOSIS — K86.89 FATTY PANCREAS: ICD-10-CM

## 2023-11-27 PROCEDURE — 99999 PR PBB SHADOW E&M-EST. PATIENT-LVL IV: CPT | Mod: PBBFAC,,, | Performed by: INTERNAL MEDICINE

## 2023-11-27 PROCEDURE — 1160F RVW MEDS BY RX/DR IN RCRD: CPT | Mod: CPTII,S$GLB,, | Performed by: INTERNAL MEDICINE

## 2023-11-27 PROCEDURE — 99214 PR OFFICE/OUTPT VISIT, EST, LEVL IV, 30-39 MIN: ICD-10-PCS | Mod: S$GLB,,, | Performed by: INTERNAL MEDICINE

## 2023-11-27 PROCEDURE — 3078F PR MOST RECENT DIASTOLIC BLOOD PRESSURE < 80 MM HG: ICD-10-PCS | Mod: CPTII,S$GLB,, | Performed by: INTERNAL MEDICINE

## 2023-11-27 PROCEDURE — 99999 PR PBB SHADOW E&M-EST. PATIENT-LVL IV: ICD-10-PCS | Mod: PBBFAC,,, | Performed by: INTERNAL MEDICINE

## 2023-11-27 PROCEDURE — 3008F BODY MASS INDEX DOCD: CPT | Mod: CPTII,S$GLB,, | Performed by: INTERNAL MEDICINE

## 2023-11-27 PROCEDURE — 1159F PR MEDICATION LIST DOCUMENTED IN MEDICAL RECORD: ICD-10-PCS | Mod: CPTII,S$GLB,, | Performed by: INTERNAL MEDICINE

## 2023-11-27 PROCEDURE — 99214 OFFICE O/P EST MOD 30 MIN: CPT | Mod: S$GLB,,, | Performed by: INTERNAL MEDICINE

## 2023-11-27 PROCEDURE — 3008F PR BODY MASS INDEX (BMI) DOCUMENTED: ICD-10-PCS | Mod: CPTII,S$GLB,, | Performed by: INTERNAL MEDICINE

## 2023-11-27 PROCEDURE — 3077F SYST BP >= 140 MM HG: CPT | Mod: CPTII,S$GLB,, | Performed by: INTERNAL MEDICINE

## 2023-11-27 PROCEDURE — 1160F PR REVIEW ALL MEDS BY PRESCRIBER/CLIN PHARMACIST DOCUMENTED: ICD-10-PCS | Mod: CPTII,S$GLB,, | Performed by: INTERNAL MEDICINE

## 2023-11-27 PROCEDURE — 1159F MED LIST DOCD IN RCRD: CPT | Mod: CPTII,S$GLB,, | Performed by: INTERNAL MEDICINE

## 2023-11-27 PROCEDURE — 3078F DIAST BP <80 MM HG: CPT | Mod: CPTII,S$GLB,, | Performed by: INTERNAL MEDICINE

## 2023-11-27 PROCEDURE — 3077F PR MOST RECENT SYSTOLIC BLOOD PRESSURE >= 140 MM HG: ICD-10-PCS | Mod: CPTII,S$GLB,, | Performed by: INTERNAL MEDICINE

## 2023-11-27 NOTE — PATIENT INSTRUCTIONS
MRI in 6 months  I have reached out to Dr Martín Boyd re the pancreas-message me later this week if I dont get back to you  Return 6 months

## 2023-11-27 NOTE — PROGRESS NOTES
HEPATOLOGY FOLLOW UP    Referring Physician: Barrera Sewell MD  Current Corresponding Physician: Barrera Sewell MD, Jonathan West MD     Mallorie Green is here for follow up of Liver cyst      HPI  Mallorie Green is a 63 y.o. female with a hx of breast cancer and a family hx of premature CAD who underwent an echo recently. Echo suggested a liver mass. I saw her in consultation 11/16/22::     Labs  11/7/22: ALT 18, aST 20, ALKP 79, Tbil 0.5  Tumor markers: CA 19-9, AFP, CEA, , CA 15-3, CA 27.29 all negative     MRI w wo contrast 11/7/22: 5.3 cm complex cyst; 1 cm cyst; normal liver     The patient denied any symptoms of decompensated cirrhosis, including no ascites or edema, cognitive problems that would suggest hepatic encephalopathy, or GI bleeding from varices. Pt denies RUQ pain    Interval History  Since Mallorie Green's last few visits:    Feeling well. Denies N/V, abdo pain or diarrhea.    Abdo US 11/15/23: Liver: Normal in size measuring 15.2 cm.  Homogeneous echotexturewith increased echogenicity.  Redemonstration of left lobe avascular structure with both cystic and solid component measuring 4.7 x 3.2 x 4.1 cm, previously 4.4 x 3.4 x 5.3 cm (Stable); fatty pancreas  MRI abdo w wo contrast 5/16/23: Slightly decreased size of a T2 hyperintense left hepatic lobe lesion measuring 4.8 x 3.7 cm (previously 5.3 x 4.2 cm) containing intrinsic T1 hyperintensity.  No suspicious enhancement or restricted diffusion.  Few additional subcentimeter simple cysts within the left and right lobes.     Outpatient Encounter Medications as of 11/27/2023   Medication Sig Dispense Refill    aspirin (ECOTRIN) 81 MG EC tablet Take 81 mg by mouth once daily.      atorvastatin (LIPITOR) 40 MG tablet Take 1 tablet (40 mg total) by mouth once daily. 90 tablet 3    montelukast (SINGULAIR) 10 mg tablet montelukast 10 mg tablet   TAKE 1 TABLET BY MOUTH EVERY DAY      [DISCONTINUED] atorvastatin (LIPITOR) 40 MG tablet  Take 1 tablet (40 mg total) by mouth once daily. 90 tablet 3     No facility-administered encounter medications on file as of 11/27/2023.     Review of patient's allergies indicates:   Allergen Reactions    Codeine     Levaquin [levofloxacin]     Opioids - morphine analogues Nausea And Vomiting     Past Medical History:   Diagnosis Date    Cancer        Review of Systems   Constitutional: Negative.    HENT: Negative.     Eyes: Negative.    Respiratory: Negative.     Cardiovascular: Negative.    Gastrointestinal: Negative.    Genitourinary: Negative.    Musculoskeletal: Negative.    Skin: Negative.    Neurological: Negative.    Psychiatric/Behavioral: Negative.       Vitals:    11/27/23 0815   BP: (!) 141/60   Pulse: (!) 54   Resp: 18       Physical Exam  Vitals reviewed.   Constitutional:       Appearance: She is well-developed.   HENT:      Head: Normocephalic and atraumatic.   Eyes:      General: No scleral icterus.     Conjunctiva/sclera: Conjunctivae normal.      Pupils: Pupils are equal, round, and reactive to light.   Neck:      Thyroid: No thyromegaly.   Cardiovascular:      Rate and Rhythm: Normal rate and regular rhythm.      Heart sounds: Normal heart sounds.   Pulmonary:      Effort: Pulmonary effort is normal.      Breath sounds: Normal breath sounds. No rales.   Abdominal:      General: Bowel sounds are normal. There is no distension.      Palpations: Abdomen is soft. There is no mass.      Tenderness: There is no abdominal tenderness.   Musculoskeletal:         General: Normal range of motion.      Cervical back: Normal range of motion and neck supple.   Skin:     General: Skin is warm and dry.      Findings: No rash.   Neurological:      Mental Status: She is alert and oriented to person, place, and time.         Computed MELD 3.0 unavailable. Necessary lab results were not found in the last year.  Computed MELD-Na unavailable. Necessary lab results were not found in the last year.      Lab Results    Component Value Date    GLU 88 09/18/2023    BUN 16 09/18/2023    CREATININE 0.8 09/18/2023    CALCIUM 9.5 09/18/2023     09/18/2023    K 4.1 09/18/2023     09/18/2023    PROT 6.7 09/18/2023    CO2 30 (H) 09/18/2023    ANIONGAP 6 (L) 09/18/2023    WBC 3.36 (L) 11/07/2022    RBC 4.55 11/07/2022    HGB 13.8 11/07/2022    HCT 41.3 11/07/2022    MCV 91 11/07/2022    MCH 30.3 11/07/2022    MCHC 33.4 11/07/2022     Lab Results   Component Value Date    RDW 12.1 11/07/2022     11/07/2022    MPV 10.0 11/07/2022    GRAN 1.9 11/07/2022    GRAN 56.5 11/07/2022    LYMPH 1.0 11/07/2022    LYMPH 30.1 11/07/2022    MONO 0.3 11/07/2022    MONO 8.9 11/07/2022    EOSINOPHIL 3.0 11/07/2022    BASOPHIL 1.2 11/07/2022    EOS 0.1 11/07/2022    BASO 0.04 11/07/2022    CHOL 143 09/18/2023    TRIG 46 09/18/2023    HDL 61 09/18/2023    CHOLHDL 42.7 09/18/2023    TOTALCHOLEST 2.3 09/18/2023    ALBUMIN 3.9 09/18/2023    AST 28 09/18/2023    ALT 27 09/18/2023    ALKPHOS 82 09/18/2023    LABPROT 10.9 11/07/2022    INR 1.1 11/07/2022       Assessment and Plan:  Mallorie Green is a 63 y.o. female with a complex liver cyst. Current recommendations:  Complex liver cyst: It is stable/slgihtly smaller in size. No worrisome features. Pt is asymptomatic. It can be seen on abdo US. Recommend continued surveillance with repeat MRI in 6 months and US in one year  Fatty pancreas- I have messaged the [facility] service to see if this needs follow up. Interestingly, she does not have metabolic syndrome, alcohol use or obesity. She is recently on lipitor which at least in vitro can cause a decrease in insulin production. I wonder is it may be related to lipitor use?    Return 6 months  A total of 35 minutes was spent reviewing the chart, imaging, labs, examining the patient and counseling the patient about their liver cyst.     Addendum:  From [facility] re fatty liver: Although there is some association reported with fatty pancreas and pancreatic  cancer, it is more likely other issues related to fatty pancreas (e.g. DM, obesity) that are more likely the culprit for panc CA risk. Fatty pancreas can be seen on imaging performed for other reasons in anywhere from 10-30% of patients.     There are no guidelines to support pancreatic cancer screening in patients with 'fatty pancreas'. Interval MRIs being done for complex liver cyst would serve as adequate screening for this as well.

## 2024-02-07 ENCOUNTER — TELEPHONE (OUTPATIENT)
Dept: HEPATOLOGY | Facility: CLINIC | Age: 64
End: 2024-02-07
Payer: COMMERCIAL

## 2024-04-29 ENCOUNTER — PATIENT MESSAGE (OUTPATIENT)
Dept: HEPATOLOGY | Facility: CLINIC | Age: 64
End: 2024-04-29
Payer: COMMERCIAL

## 2024-05-07 ENCOUNTER — LAB VISIT (OUTPATIENT)
Dept: LAB | Facility: HOSPITAL | Age: 64
End: 2024-05-07
Attending: INTERNAL MEDICINE
Payer: COMMERCIAL

## 2024-05-07 DIAGNOSIS — K76.89 LIVER CYST: ICD-10-CM

## 2024-05-07 LAB
CREAT SERPL-MCNC: 0.8 MG/DL (ref 0.5–1.4)
EST. GFR  (NO RACE VARIABLE): >60 ML/MIN/1.73 M^2

## 2024-05-07 PROCEDURE — 36415 COLL VENOUS BLD VENIPUNCTURE: CPT | Performed by: INTERNAL MEDICINE

## 2024-05-07 PROCEDURE — 82565 ASSAY OF CREATININE: CPT | Performed by: INTERNAL MEDICINE

## 2024-05-15 ENCOUNTER — HOSPITAL ENCOUNTER (OUTPATIENT)
Dept: RADIOLOGY | Facility: HOSPITAL | Age: 64
Discharge: HOME | End: 2024-05-15
Attending: INTERNAL MEDICINE
Payer: COMMERCIAL

## 2024-05-15 DIAGNOSIS — K76.89 LIVER CYST: ICD-10-CM

## 2024-05-15 PROCEDURE — A9585 GADOBUTROL INJECTION: HCPCS | Performed by: INTERNAL MEDICINE

## 2024-05-15 PROCEDURE — 74183 MRI ABD W/O CNTR FLWD CNTR: CPT | Mod: TC

## 2024-05-15 PROCEDURE — 25500020 PHARM REV CODE 255: Performed by: INTERNAL MEDICINE

## 2024-05-15 PROCEDURE — 74183 MRI ABD W/O CNTR FLWD CNTR: CPT | Mod: 26,,, | Performed by: RADIOLOGY

## 2024-05-15 RX ORDER — GADOBUTROL 604.72 MG/ML
10 INJECTION INTRAVENOUS
Status: COMPLETED | OUTPATIENT
Start: 2024-05-15 | End: 2024-05-15

## 2024-05-15 RX ADMIN — GADOBUTROL 10 ML: 604.72 INJECTION INTRAVENOUS at 07:05

## 2024-05-27 ENCOUNTER — TELEPHONE (OUTPATIENT)
Dept: TRANSPLANT | Facility: CLINIC | Age: 64
End: 2024-05-27
Payer: COMMERCIAL

## 2024-05-27 ENCOUNTER — PATIENT MESSAGE (OUTPATIENT)
Dept: HEPATOLOGY | Facility: CLINIC | Age: 64
End: 2024-05-27

## 2024-05-27 ENCOUNTER — OFFICE VISIT (OUTPATIENT)
Dept: HEPATOLOGY | Facility: CLINIC | Age: 64
End: 2024-05-27
Payer: COMMERCIAL

## 2024-05-27 VITALS
WEIGHT: 126.75 LBS | SYSTOLIC BLOOD PRESSURE: 144 MMHG | BODY MASS INDEX: 21.64 KG/M2 | OXYGEN SATURATION: 99 % | HEIGHT: 64 IN | DIASTOLIC BLOOD PRESSURE: 81 MMHG | HEART RATE: 61 BPM

## 2024-05-27 DIAGNOSIS — K76.89 LIVER CYST: Primary | ICD-10-CM

## 2024-05-27 PROCEDURE — 3077F SYST BP >= 140 MM HG: CPT | Mod: CPTII,S$GLB,, | Performed by: INTERNAL MEDICINE

## 2024-05-27 PROCEDURE — 99214 OFFICE O/P EST MOD 30 MIN: CPT | Mod: S$GLB,,, | Performed by: INTERNAL MEDICINE

## 2024-05-27 PROCEDURE — 1159F MED LIST DOCD IN RCRD: CPT | Mod: CPTII,S$GLB,, | Performed by: INTERNAL MEDICINE

## 2024-05-27 PROCEDURE — 3079F DIAST BP 80-89 MM HG: CPT | Mod: CPTII,S$GLB,, | Performed by: INTERNAL MEDICINE

## 2024-05-27 PROCEDURE — 3008F BODY MASS INDEX DOCD: CPT | Mod: CPTII,S$GLB,, | Performed by: INTERNAL MEDICINE

## 2024-05-27 PROCEDURE — 1160F RVW MEDS BY RX/DR IN RCRD: CPT | Mod: CPTII,S$GLB,, | Performed by: INTERNAL MEDICINE

## 2024-05-27 PROCEDURE — 99999 PR PBB SHADOW E&M-EST. PATIENT-LVL IV: CPT | Mod: PBBFAC,,, | Performed by: INTERNAL MEDICINE

## 2024-05-27 RX ORDER — ALPRAZOLAM 0.25 MG/1
1 TABLET ORAL 3 TIMES DAILY
COMMUNITY

## 2024-05-27 RX ORDER — BUSPIRONE HYDROCHLORIDE 10 MG/1
1 TABLET ORAL 2 TIMES DAILY
COMMUNITY

## 2024-05-27 RX ORDER — MOMETASONE FUROATE 1 MG/G
OINTMENT TOPICAL
COMMUNITY

## 2024-05-27 NOTE — PATIENT INSTRUCTIONS
I have messaged Dr Jin re whether or not there is still a fatty pancreas given you stopped lipitor 11/23  Repeat an MRI in 1 year (to f/u on liver cyst and fatty pancreas) with labs  Return 1 year

## 2024-05-27 NOTE — PROGRESS NOTES
HEPATOLOGY FOLLOW UP    Referring Physician: Barrera Sewell MD  Current Corresponding Physician: Barrera Sewell MD, Jonathan West MD     Mallorie Green is here for follow up of Complex liver cyst      HPI  Mallorie Green is a 63 y.o. female with a hx of breast cancer and a family hx of premature CAD who underwent an echo recently. Echo suggested a liver mass. I saw her in consultation 11/16/22::     Labs  11/7/22: ALT 18, aST 20, ALKP 79, Tbil 0.5  Tumor markers: CA 19-9, AFP, CEA, , CA 15-3, CA 27.29 all negative     MRI w wo contrast 11/7/22: 5.3 cm complex cyst; 1 cm cyst; normal liver     The patient denied any symptoms of decompensated cirrhosis, including no ascites or edema, cognitive problems that would suggest hepatic encephalopathy, or GI bleeding from varices. Pt denies RUQ pain    Interval History  Since Mallorie Green's last few visits:    Feeling well. Denies N/V, abdo pain or diarrhea.    Abdo US 11/15/23: Liver: Normal in size measuring 15.2 cm.  Homogeneous echotexturewith increased echogenicity.  Redemonstration of left lobe avascular structure with both cystic and solid component measuring 4.7 x 3.2 x 4.1 cm, previously 4.4 x 3.4 x 5.3 cm (Stable); fatty pancreas  MRI abdo w wo contrast 5/15/24: Normal background parenchymal signal. Normal contour. 3.4 x 2.6 cm focus of heterogeneous T2 signal with intrinsic T1 hyperintensity. This previously measured 4.8 x 3.7 cm on most recent exam and 5.3 x 4.2 cm on exam from 11/07/2022. There is more hypo T2 signal on today's exam compared to exam from 11/07/2022. No suspicious enhancement. A few additional subcentimeter simple cysts. Impression: Continued interval decrease in size of left hepatic lobe cystic lesion. Findings are most suggestive of hemorrhagic/proteinaceous cyst.     Outpatient Encounter Medications as of 5/27/2024   Medication Sig Dispense Refill    ALPRAZolam (XANAX) 0.25 MG tablet Take 1 tablet by mouth 3 (three)  times daily.      aspirin (ECOTRIN) 81 MG EC tablet Take 81 mg by mouth once daily.      busPIRone (BUSPAR) 10 MG tablet Take 1 tablet by mouth 2 (two) times daily.      mometasone (ELOCON) 0.1 % ointment Apply 1 application 3 times a day by topical route as needed for 90 days.      montelukast (SINGULAIR) 10 mg tablet montelukast 10 mg tablet   TAKE 1 TABLET BY MOUTH EVERY DAY      atorvastatin (LIPITOR) 40 MG tablet Take 1 tablet (40 mg total) by mouth once daily. (Patient not taking: Reported on 5/27/2024) 90 tablet 3     No facility-administered encounter medications on file as of 5/27/2024.     Review of patient's allergies indicates:   Allergen Reactions    Codeine     Levaquin [levofloxacin]     Opioids - morphine analogues Nausea And Vomiting     Past Medical History:   Diagnosis Date    Cancer     Fatty pancreas 11/27/2023       Review of Systems   Constitutional: Negative.    HENT: Negative.     Eyes: Negative.    Respiratory: Negative.     Cardiovascular: Negative.    Gastrointestinal: Negative.    Genitourinary: Negative.    Musculoskeletal: Negative.    Skin: Negative.    Neurological: Negative.    Psychiatric/Behavioral: Negative.       Vitals:    05/27/24 1055   BP: (!) 144/81   Pulse: 61       Physical Exam  Vitals reviewed.   Constitutional:       Appearance: She is well-developed.   HENT:      Head: Normocephalic and atraumatic.   Eyes:      General: No scleral icterus.     Conjunctiva/sclera: Conjunctivae normal.      Pupils: Pupils are equal, round, and reactive to light.   Neck:      Thyroid: No thyromegaly.   Cardiovascular:      Rate and Rhythm: Normal rate and regular rhythm.      Heart sounds: Normal heart sounds.   Pulmonary:      Effort: Pulmonary effort is normal.      Breath sounds: Normal breath sounds. No rales.   Abdominal:      General: Bowel sounds are normal. There is no distension.      Palpations: Abdomen is soft. There is no mass.      Tenderness: There is no abdominal  tenderness.   Musculoskeletal:         General: Normal range of motion.      Cervical back: Normal range of motion and neck supple.   Skin:     General: Skin is warm and dry.      Findings: No rash.   Neurological:      Mental Status: She is alert and oriented to person, place, and time.         Computed MELD 3.0 unavailable. One or more values for this score either were not found within the given timeframe or did not fit some other criterion.  Computed MELD-Na unavailable. One or more values for this score either were not found within the given timeframe or did not fit some other criterion.      Lab Results   Component Value Date    GLU 88 09/18/2023    BUN 16 09/18/2023    CREATININE 0.8 05/07/2024    CALCIUM 9.5 09/18/2023     09/18/2023    K 4.1 09/18/2023     09/18/2023    PROT 6.7 09/18/2023    CO2 30 (H) 09/18/2023    ANIONGAP 6 (L) 09/18/2023    WBC 3.36 (L) 11/07/2022    RBC 4.55 11/07/2022    HGB 13.8 11/07/2022    HCT 41.3 11/07/2022    MCV 91 11/07/2022    MCH 30.3 11/07/2022    MCHC 33.4 11/07/2022     Lab Results   Component Value Date    RDW 12.1 11/07/2022     11/07/2022    MPV 10.0 11/07/2022    GRAN 1.9 11/07/2022    GRAN 56.5 11/07/2022    LYMPH 1.0 11/07/2022    LYMPH 30.1 11/07/2022    MONO 0.3 11/07/2022    MONO 8.9 11/07/2022    EOSINOPHIL 3.0 11/07/2022    BASOPHIL 1.2 11/07/2022    EOS 0.1 11/07/2022    BASO 0.04 11/07/2022    CHOL 143 09/18/2023    TRIG 46 09/18/2023    HDL 61 09/18/2023    CHOLHDL 42.7 09/18/2023    TOTALCHOLEST 2.3 09/18/2023    ALBUMIN 3.9 09/18/2023    AST 28 09/18/2023    ALT 27 09/18/2023    ALKPHOS 82 09/18/2023    LABPROT 10.9 11/07/2022    INR 1.1 11/07/2022       Assessment and Plan:  Mallorie Green is a 63 y.o. female with a complex liver cyst. Current recommendations:  Complex liver cyst: It is again slightly smaller in size. No worrisome features. Pt is asymptomatic. It can be seen on abdo US. Since it continues to get smaller, Recommend  continued surveillance with repeat MRI in one year (to f/u on liver cyst and fatty pancreas) with labs  Fatty pancreas- as per AES: no screening for pancreatic cancer needed (see below)    Return 12 months  A total of 35 minutes was spent reviewing the chart, imaging, labs, examining the patient and counseling the patient about their liver cyst.     Addendum:  From AES re fatty liver: Although there is some association reported with fatty pancreas and pancreatic cancer, it is more likely other issues related to fatty pancreas (e.g. DM, obesity) that are more likely the culprit for panc CA risk. Fatty pancreas can be seen on imaging performed for other reasons in anywhere from 10-30% of patients.     There are no guidelines to support pancreatic cancer screening in patients with 'fatty pancreas'. Interval MRIs being done for complex liver cyst would serve as adequate screening for this as well.     5/27/24: Reviewed imaging of the pancreas with radiologist:     There is no fatty infiltration of the pancreas.  On the prior ultrasound where that was mentioned, it should have said that it can be seen with fatty infiltration or normal variant.      Her pancreas is normal.

## 2024-05-27 NOTE — TELEPHONE ENCOUNTER
Reviewed imaging of the pancreas with radiologist:    There is no fatty infiltration of the pancreas.  On the prior ultrasound where that was mentioned, it should have said that it can be seen with fatty infiltration or normal variant.      Her pancreas is normal.

## 2024-06-21 ENCOUNTER — OFFICE VISIT (OUTPATIENT)
Dept: CARDIOLOGY | Facility: CLINIC | Age: 64
End: 2024-06-21
Payer: COMMERCIAL

## 2024-06-21 VITALS
BODY MASS INDEX: 22.06 KG/M2 | SYSTOLIC BLOOD PRESSURE: 115 MMHG | HEIGHT: 64 IN | DIASTOLIC BLOOD PRESSURE: 61 MMHG | WEIGHT: 129.19 LBS | HEART RATE: 56 BPM

## 2024-06-21 DIAGNOSIS — E78.2 MIXED HYPERLIPIDEMIA: Primary | ICD-10-CM

## 2024-06-21 DIAGNOSIS — R07.89 OTHER CHEST PAIN: ICD-10-CM

## 2024-06-21 DIAGNOSIS — Z82.49 FAMILY HISTORY OF PREMATURE CAD: ICD-10-CM

## 2024-06-21 PROCEDURE — 99999 PR PBB SHADOW E&M-EST. PATIENT-LVL III: CPT | Mod: PBBFAC,,, | Performed by: INTERNAL MEDICINE

## 2024-06-21 NOTE — PATIENT INSTRUCTIONS
Assessment/Plan:  Mallorie Green is a 63 y.o. female with breast cancer s/p right mastectomy with radiation/chemotherapy (2000), HLD, who presents for an initial appointment.     HLD- ASCVD risk score is 4%.  Mrs. Green reports being off atorvastatin since 11/2023 due to concern for fatty pancreas based on an erroneous ultrasound report.  She has now restarted atorvastatin 40 mg daily.  LDL 73 ln 9/18/2023.  Continue atorvastatin 40 mg daily and ASA 81 mg daily.  Check lipids now and prior to next visit.     Follow up in 4 months with lipids and cmp prior

## 2024-06-21 NOTE — PROGRESS NOTES
"[facility]      Chief Complaint   Patient presents with    Mixed hyperlipidemia       Patient ID: Mallorie Green is a 62 y.o. female with breast cancer s/p right mastectomy with radiation/chemotherapy (2000), HLD, who presents for an initial appointment.  Pertinent history/events are as follows:     -Pt presents to establish cardiac care.    -At our initial clinic visit on 11/2/2022, Mrs. Green reports chest pain localized at the left breast area (behind the clavicle) when she "gets upset".  Pain is described as "sharp", 6/10 in intensity, lasting a few seconds.  Episodes occur 2-3 times per week.  Last episode occurred last night.  Pt currently with no chest pain.  She reports worsening fatigue over the past 1 year.  No smoking history.  Reports family history of CAD with MI in mother at age 55.  Pt is a retired .  Outside labs from 9/17/2022 shows total cholesterol 230 with .  EKG today shows sinus bradycardia (rate 47 bpm) with no ischemic ST/T wave changes.   Plan:   Chest Pain/Fatigue- Pt with risk factors for CAD.  Check exercise nuclear stress test and echo to evaluate further.  HLD- ASCVD risk score is 4%.  Start atorvastatin 40 mg daily and ASA 81 mg daily.      11/4/2022: Echo done yesterday shows large mass in the liver measuring 4.7 x 5 cm.  Echo otherwise is within normal limits with normal LV systolic and diastolic function and no valve abnormalities.  Stress test done yesterday shows no evidence of ischemia.  Plan:  -refer to Hepatology in General surgery for further evaluation of the liver mass.  -hold off on starting atorvastatin until further evaluation of liver mass.  -results and plan discussed in detail Mrs. Green, who voiced understanding.    -At follow up clinic visit on 3/15/2023, Mrs. Green reported no significant chest pain, SOB, LE edema, TIA symptoms or syncope.  Echo on 11/3/2022 revealed a large mass in the liver measuring 4.7 x 5 cm in the liver consider " further imaging with CT.  EF 65% with no significant valvular abnormalities.  Nuclear Stress Test on 11/3/2022 revealed normal myocardial perfusion with no evidence of myocardial ischemia or infarction.  LDL 75 on 3/8/2022.  Plan:   Chest Pain/Fatigue- Pt with no significant chest pain or fatigue currently.  Echo on 11/3/2022 revealed a large mass in the liver measuring 4.7 x 5 cm in the liver consider further imaging with CT.  EF 65% with no significant valvular abnormalities.  Nuclear Stress Test on 11/3/2022 revealed normal myocardial perfusion with no evidence of myocardial ischemia or infarction.  Continue aggressive risk factor modification.    HLD- ASCVD risk score is 4%.   LDL 75 on 3/8/2022.  Continue atorvastatin 40 mg daily and ASA 81 mg daily.    HPI:  Mrs. Green reports being off atorvastatin since 2023 due to concern for fatty pancreas based on an erroneous ultrasound report.  She has now restarted atorvastatin 40 mg daily.  LDL 73 ln 2023.       Past Medical History:   Diagnosis Date    Cancer     Fatty pancreas 2023     Past Surgical History:   Procedure Laterality Date    BRAIN SURGERY      BREAST SURGERY       SECTION      CHOLECYSTECTOMY      HYSTERECTOMY       Social History     Socioeconomic History    Marital status:    Tobacco Use    Smoking status: Never    Smokeless tobacco: Never   Substance and Sexual Activity    Alcohol use: No    Drug use: No    Sexual activity: Never     Social Determinants of Health     Financial Resource Strain: Low Risk  (2024)    Overall Financial Resource Strain (CARDIA)     Difficulty of Paying Living Expenses: Not hard at all   Food Insecurity: No Food Insecurity (2024)    Hunger Vital Sign     Worried About Running Out of Food in the Last Year: Never true     Ran Out of Food in the Last Year: Never true   Physical Activity: Sufficiently Active (2024)    Exercise Vital Sign     Days of Exercise per Week: 7 days      "Minutes of Exercise per Session: 60 min   Stress: Stress Concern Present (6/21/2024)    [employer] - Occupational Stress Questionnaire     Feeling of Stress : To some extent   Housing Stability: Unknown (6/21/2024)    Housing Stability Vital Sign     Unable to Pay for Housing in the Last Year: No     No family history on file.    Review of patient's allergies indicates:   Allergen Reactions    Codeine     Levaquin [levofloxacin]     Opioids - morphine analogues Nausea And Vomiting           Review of Systems  Constitution: Denies chills, fever, and sweats.  HENT: Denies headaches or blurry vision.  Cardiovascular: Positive for chest pain.  Respiratory: Denies cough or shortness of breath.  Gastrointestinal: Denies abdominal pain, nausea, or vomiting.  Musculoskeletal: Denies muscle cramps.  Neurological: Denies dizziness or focal weakness.  Psychiatric/Behavioral: Normal mental status.  Hematologic/Lymphatic: Denies bleeding problem or easy bruising/bleeding.  Skin: Denies rash or suspicious lesions    Physical Examination  /61   Pulse (!) 56   Ht 5' 4" (1.626 m)   Wt 58.6 kg (129 lb 3 oz)   BMI 22.18 kg/m²     Constitutional: No acute distress, conversant  HEENT: Sclera anicteric, Pupils equal, round and reactive to light, extraocular motions intact, Oropharynx clear  Neck: No JVD, no carotid bruits  Cardiovascular: regular rate and rhythm, no murmur, rubs or gallops, normal S1/S2  Pulmonary: Clear to auscultation bilaterally  Abdominal: Abdomen soft, nontender, nondistended, positive bowel sounds  Extremities: No lower extremity edema,   Pulses:  Carotid pulses are 2+ on the right side, and 2+ on the left side.  Radial pulses are 2+ on the right side, and 2+ on the left side.   Femoral pulses are 2+ on the right side, and 2+ on the left side.  Popliteal pulses are 2+ on the right side, and 2+ on the left side.   Dorsalis pedis pulses are 2+ on the right side, and 2+ on the " "left side.   Posterior tibial pulses are 2+ on the right side, and 2+ on the left side.    Skin: No ecchymosis, erythema, or ulcers  Psych: Alert and oriented x 3, appropriate affect  Neuro: CNII-XII intact, no focal deficits    Labs:  Most Recent Data  CBC:   Lab Results   Component Value Date    WBC 3.36 (L) 11/07/2022    HGB 13.8 11/07/2022    HCT 41.3 11/07/2022     11/07/2022    MCV 91 11/07/2022    RDW 12.1 11/07/2022     BMP:   Lab Results   Component Value Date     09/18/2023    K 4.1 09/18/2023     09/18/2023    CO2 30 (H) 09/18/2023    BUN 16 09/18/2023    CREATININE 0.8 05/07/2024    GLU 88 09/18/2023    CALCIUM 9.5 09/18/2023     LFTS;   Lab Results   Component Value Date    PROT 6.7 09/18/2023    ALBUMIN 3.9 09/18/2023    BILITOT 0.7 09/18/2023    AST 28 09/18/2023    ALKPHOS 82 09/18/2023    ALT 27 09/18/2023     COAGS:   Lab Results   Component Value Date    INR 1.1 11/07/2022     FLP:   Lab Results   Component Value Date    CHOL 143 09/18/2023    HDL 61 09/18/2023    LDLCALC 72.8 09/18/2023    TRIG 46 09/18/2023    CHOLHDL 42.7 09/18/2023     CARDIAC: No results found for: "TROPONINI", "CKTOTAL", "CKMB", "BNP"    Imaging:    EKG 11/2/2022:  Sinus bradycardia (rate 47 bpm) with no ischemic ST/T wave changes    Echo 11/3/2022:  Large mass in the liver measuring 4.7 x 5 cm in the liver consider further imaging with CT  The estimated ejection fraction is 65%.  The left ventricle is normal in size with normal systolic function.  Normal left ventricular diastolic function.  Normal right ventricular size with normal right ventricular systolic function.  The estimated PA systolic pressure is 20 mmHg.  Intermediate central venous pressure (8 mmHg).    Nuclear Stress Test 11/3/2022:  Normal myocardial perfusion scan. There is no evidence of myocardial ischemia or infarction.    The gated perfusion images showed an ejection fraction of 62% at rest. The gated perfusion images showed an " ejection fraction of 54% post stress. Normal ejection fraction is greater than 53%.    There is normal wall motion at rest and post stress.    LV cavity size is normal at rest and normal at stress.    The EKG portion of this study is negative for ischemia.    The patient reported no chest pain during the stress test.    There were no arrhythmias during stress.    There are no prior studies for comparison.    Assessment/Plan:  Mallorie Green is a 63 y.o. female with breast cancer s/p right mastectomy with radiation/chemotherapy (2000), HLD, who presents for an initial appointment.     HLD- ASCVD risk score is 4%.  Mrs. Green reports being off atorvastatin since 11/2023 due to concern for fatty pancreas based on an erroneous ultrasound report.  She has now restarted atorvastatin 40 mg daily.  LDL 73 ln 9/18/2023.  Continue atorvastatin 40 mg daily and ASA 81 mg daily.  Check lipids now and prior to next visit.     Follow up in 4 months with lipids and cmp prior    Total duration of face to face visit time 45 minutes.  Total time spent counseling greater than fifty percent of total visit time.  Counseling included discussion regarding imaging findings, diagnosis, possibilities, treatment options, risks and benefits.  The patient had many questions regarding the options and long-term effects.    Barrera Sewell MD, PhD  Interventional Cardiology

## 2024-06-22 ENCOUNTER — PATIENT MESSAGE (OUTPATIENT)
Dept: CARDIOLOGY | Facility: CLINIC | Age: 64
End: 2024-06-22
Payer: COMMERCIAL

## 2024-06-24 ENCOUNTER — LAB VISIT (OUTPATIENT)
Dept: LAB | Facility: HOSPITAL | Age: 64
End: 2024-06-24
Attending: INTERNAL MEDICINE
Payer: COMMERCIAL

## 2024-06-24 DIAGNOSIS — E78.2 MIXED HYPERLIPIDEMIA: ICD-10-CM

## 2024-06-24 LAB
CHOLEST SERPL-MCNC: 203 MG/DL (ref 120–199)
CHOLEST/HDLC SERPL: 3 {RATIO} (ref 2–5)
HDLC SERPL-MCNC: 68 MG/DL (ref 40–75)
HDLC SERPL: 33.5 % (ref 20–50)
LDLC SERPL CALC-MCNC: 126.8 MG/DL (ref 63–159)
NONHDLC SERPL-MCNC: 135 MG/DL
TRIGL SERPL-MCNC: 41 MG/DL (ref 30–150)

## 2024-06-24 PROCEDURE — 36415 COLL VENOUS BLD VENIPUNCTURE: CPT | Performed by: INTERNAL MEDICINE

## 2024-06-24 PROCEDURE — 80061 LIPID PANEL: CPT | Performed by: INTERNAL MEDICINE

## 2024-07-01 RX ORDER — ATORVASTATIN CALCIUM 40 MG/1
40 TABLET, FILM COATED ORAL DAILY
Qty: 90 TABLET | Refills: 3 | Status: SHIPPED | OUTPATIENT
Start: 2024-07-01 | End: 2025-07-01

## 2024-11-12 ENCOUNTER — LAB VISIT (OUTPATIENT)
Dept: LAB | Facility: HOSPITAL | Age: 64
End: 2024-11-12
Attending: INTERNAL MEDICINE
Payer: COMMERCIAL

## 2024-11-12 DIAGNOSIS — E78.2 MIXED HYPERLIPIDEMIA: ICD-10-CM

## 2024-11-12 LAB
ALBUMIN SERPL BCP-MCNC: 3.7 G/DL (ref 3.5–5.2)
ALP SERPL-CCNC: 102 U/L (ref 40–150)
ALT SERPL W/O P-5'-P-CCNC: 26 U/L (ref 10–44)
ANION GAP SERPL CALC-SCNC: 7 MMOL/L (ref 8–16)
AST SERPL-CCNC: 26 U/L (ref 10–40)
BILIRUB SERPL-MCNC: 0.6 MG/DL (ref 0.1–1)
BUN SERPL-MCNC: 11 MG/DL (ref 8–23)
CALCIUM SERPL-MCNC: 9.1 MG/DL (ref 8.7–10.5)
CHLORIDE SERPL-SCNC: 105 MMOL/L (ref 95–110)
CHOLEST SERPL-MCNC: 168 MG/DL (ref 120–199)
CHOLEST/HDLC SERPL: 2.5 {RATIO} (ref 2–5)
CO2 SERPL-SCNC: 29 MMOL/L (ref 23–29)
CREAT SERPL-MCNC: 0.8 MG/DL (ref 0.5–1.4)
EST. GFR  (NO RACE VARIABLE): >60 ML/MIN/1.73 M^2
GLUCOSE SERPL-MCNC: 85 MG/DL (ref 70–110)
HDLC SERPL-MCNC: 66 MG/DL (ref 40–75)
HDLC SERPL: 39.3 % (ref 20–50)
LDLC SERPL CALC-MCNC: 88.8 MG/DL (ref 63–159)
NONHDLC SERPL-MCNC: 102 MG/DL
POTASSIUM SERPL-SCNC: 4.1 MMOL/L (ref 3.5–5.1)
PROT SERPL-MCNC: 6.7 G/DL (ref 6–8.4)
SODIUM SERPL-SCNC: 141 MMOL/L (ref 136–145)
TRIGL SERPL-MCNC: 66 MG/DL (ref 30–150)

## 2024-11-12 PROCEDURE — 80053 COMPREHEN METABOLIC PANEL: CPT | Performed by: INTERNAL MEDICINE

## 2024-11-12 PROCEDURE — 80061 LIPID PANEL: CPT | Performed by: INTERNAL MEDICINE

## 2024-11-12 PROCEDURE — 36415 COLL VENOUS BLD VENIPUNCTURE: CPT | Performed by: INTERNAL MEDICINE

## 2024-11-14 ENCOUNTER — TELEPHONE (OUTPATIENT)
Dept: HEPATOLOGY | Facility: CLINIC | Age: 64
End: 2024-11-14
Payer: COMMERCIAL

## 2024-11-14 NOTE — TELEPHONE ENCOUNTER
Spoke with pt and canceled appt, the pt msg to schedule an appt in May with Dr. Silverman. Schedule is currently not open for May.

## 2024-11-18 ENCOUNTER — TELEPHONE (OUTPATIENT)
Dept: CARDIOLOGY | Facility: CLINIC | Age: 64
End: 2024-11-18
Payer: COMMERCIAL

## 2024-11-26 ENCOUNTER — OFFICE VISIT (OUTPATIENT)
Dept: CARDIOLOGY | Facility: CLINIC | Age: 64
End: 2024-11-26
Payer: COMMERCIAL

## 2024-11-26 VITALS — HEIGHT: 64 IN | BODY MASS INDEX: 21.51 KG/M2 | WEIGHT: 126 LBS

## 2024-11-26 DIAGNOSIS — E78.2 MIXED HYPERLIPIDEMIA: Primary | ICD-10-CM

## 2024-11-26 DIAGNOSIS — Z82.49 FAMILY HISTORY OF PREMATURE CAD: ICD-10-CM

## 2024-11-26 PROCEDURE — 99213 OFFICE O/P EST LOW 20 MIN: CPT | Mod: 95,,, | Performed by: INTERNAL MEDICINE

## 2024-11-26 PROCEDURE — 1159F MED LIST DOCD IN RCRD: CPT | Mod: CPTII,95,, | Performed by: INTERNAL MEDICINE

## 2024-11-26 PROCEDURE — 3288F FALL RISK ASSESSMENT DOCD: CPT | Mod: CPTII,95,, | Performed by: INTERNAL MEDICINE

## 2024-11-26 PROCEDURE — 3008F BODY MASS INDEX DOCD: CPT | Mod: CPTII,95,, | Performed by: INTERNAL MEDICINE

## 2024-11-26 PROCEDURE — 1101F PT FALLS ASSESS-DOCD LE1/YR: CPT | Mod: CPTII,95,, | Performed by: INTERNAL MEDICINE

## 2024-11-26 RX ORDER — ATORVASTATIN CALCIUM 40 MG/1
40 TABLET, FILM COATED ORAL DAILY
Qty: 90 TABLET | Refills: 3 | Status: SHIPPED | OUTPATIENT
Start: 2024-11-26 | End: 2025-11-26

## 2024-11-26 NOTE — PROGRESS NOTES
"[facility]      Chief Complaint   Patient presents with    Mixed hyperlipidemia       Patient ID: Mallorie Green is a 62 y.o. female with breast cancer s/p right mastectomy with radiation/chemotherapy (2000), HLD, who presents for an initial appointment.  Pertinent history/events are as follows:     -Pt presents to establish cardiac care.    -At our initial clinic visit on 11/2/2022, Mrs. Green reports chest pain localized at the left breast area (behind the clavicle) when she "gets upset".  Pain is described as "sharp", 6/10 in intensity, lasting a few seconds.  Episodes occur 2-3 times per week.  Last episode occurred last night.  Pt currently with no chest pain.  She reports worsening fatigue over the past 1 year.  No smoking history.  Reports family history of CAD with MI in mother at age 55.  Pt is a retired .  Outside labs from 9/17/2022 shows total cholesterol 230 with .  EKG today shows sinus bradycardia (rate 47 bpm) with no ischemic ST/T wave changes.   Plan:   Chest Pain/Fatigue- Pt with risk factors for CAD.  Check exercise nuclear stress test and echo to evaluate further.  HLD- ASCVD risk score is 4%.  Start atorvastatin 40 mg daily and ASA 81 mg daily.      11/4/2022: Echo done yesterday shows large mass in the liver measuring 4.7 x 5 cm.  Echo otherwise is within normal limits with normal LV systolic and diastolic function and no valve abnormalities.  Stress test done yesterday shows no evidence of ischemia.  Plan:  -refer to Hepatology in General surgery for further evaluation of the liver mass.  -hold off on starting atorvastatin until further evaluation of liver mass.  -results and plan discussed in detail Mrs. Green, who voiced understanding.    -At follow up clinic visit on 3/15/2023, Mrs. Green reported no significant chest pain, SOB, LE edema, TIA symptoms or syncope.  Echo on 11/3/2022 revealed a large mass in the liver measuring 4.7 x 5 cm in the liver consider " further imaging with CT.  EF 65% with no significant valvular abnormalities.  Nuclear Stress Test on 11/3/2022 revealed normal myocardial perfusion with no evidence of myocardial ischemia or infarction.  LDL 75 on 3/8/2022.  Plan:   Chest Pain/Fatigue- Pt with no significant chest pain or fatigue currently.  Echo on 11/3/2022 revealed a large mass in the liver measuring 4.7 x 5 cm in the liver consider further imaging with CT.  EF 65% with no significant valvular abnormalities.  Nuclear Stress Test on 11/3/2022 revealed normal myocardial perfusion with no evidence of myocardial ischemia or infarction.  Continue aggressive risk factor modification.    HLD- ASCVD risk score is 4%.   LDL 75 on 3/8/2022.  Continue atorvastatin 40 mg daily and ASA 81 mg daily.    2024 clinic visit: Mrs. Green reports being off atorvastatin since 2023 due to concern for fatty pancreas based on an erroneous ultrasound report.  She has now restarted atorvastatin 40 mg daily.  LDL 73 ln 2023.    Plan:  HLD- ASCVD risk score is 4%.  Mrs. Green reports being off atorvastatin since 2023 due to concern for fatty pancreas based on an erroneous ultrasound report.  She has now restarted atorvastatin 40 mg daily.  LDL 73 ln 2023.  Continue atorvastatin 40 mg daily and ASA 81 mg daily.  Check lipids now and prior to next visit.     HPI:  Mrs. Green reports doing well with no chest pain or SOB. She is tolerating atorvastatin 40 mg daily with no issues.  LDL 89 with LFT's within normal limits on 2024.    Past Medical History:   Diagnosis Date    Cancer     Fatty pancreas 2023     Past Surgical History:   Procedure Laterality Date    BRAIN SURGERY      BREAST SURGERY       SECTION      CHOLECYSTECTOMY      HYSTERECTOMY       Social History     Socioeconomic History    Marital status:    Tobacco Use    Smoking status: Never    Smokeless tobacco: Never   Substance and Sexual Activity    Alcohol use: No    Drug  "use: No    Sexual activity: Never     Social Drivers of Health     Financial Resource Strain: Low Risk  (6/21/2024)    Overall Financial Resource Strain (CARDIA)     Difficulty of Paying Living Expenses: Not hard at all   Food Insecurity: No Food Insecurity (6/21/2024)    Hunger Vital Sign     Worried About Running Out of Food in the Last Year: Never true     Ran Out of Food in the Last Year: Never true   Physical Activity: Sufficiently Active (6/21/2024)    Exercise Vital Sign     Days of Exercise per Week: 7 days     Minutes of Exercise per Session: 60 min   Stress: Stress Concern Present (6/21/2024)    [place] [employer] - Occupational Stress Questionnaire     Feeling of Stress : To some extent   Housing Stability: Unknown (6/21/2024)    Housing Stability Vital Sign     Unable to Pay for Housing in the Last Year: No     No family history on file.    Review of patient's allergies indicates:   Allergen Reactions    Codeine     Levaquin [levofloxacin]     Opioids - morphine analogues Nausea And Vomiting           Review of Systems  Constitution: Denies chills, fever, and sweats.  HENT: Denies headaches or blurry vision.  Cardiovascular: Positive for chest pain.  Respiratory: Denies cough or shortness of breath.  Gastrointestinal: Denies abdominal pain, nausea, or vomiting.  Musculoskeletal: Denies muscle cramps.  Neurological: Denies dizziness or focal weakness.  Psychiatric/Behavioral: Normal mental status.  Hematologic/Lymphatic: Denies bleeding problem or easy bruising/bleeding.  Skin: Denies rash or suspicious lesions    Physical Examination  Ht 5' 4" (1.626 m)   Wt 57.2 kg (126 lb)   BMI 21.63 kg/m²     Video visit performed    Labs:  Most Recent Data  CBC:   Lab Results   Component Value Date    WBC 3.36 (L) 11/07/2022    HGB 13.8 11/07/2022    HCT 41.3 11/07/2022     11/07/2022    MCV 91 11/07/2022    RDW 12.1 11/07/2022     BMP:   Lab Results   Component Value Date     " "11/12/2024    K 4.1 11/12/2024     11/12/2024    CO2 29 11/12/2024    BUN 11 11/12/2024    CREATININE 0.8 11/12/2024    GLU 85 11/12/2024    CALCIUM 9.1 11/12/2024     LFTS;   Lab Results   Component Value Date    PROT 6.7 11/12/2024    ALBUMIN 3.7 11/12/2024    BILITOT 0.6 11/12/2024    AST 26 11/12/2024    ALKPHOS 102 11/12/2024    ALT 26 11/12/2024     COAGS:   Lab Results   Component Value Date    INR 1.1 11/07/2022     FLP:   Lab Results   Component Value Date    CHOL 168 11/12/2024    HDL 66 11/12/2024    LDLCALC 88.8 11/12/2024    TRIG 66 11/12/2024    CHOLHDL 39.3 11/12/2024     CARDIAC: No results found for: "TROPONINI", "CKTOTAL", "CKMB", "BNP"    Imaging:    EKG 11/2/2022:  Sinus bradycardia (rate 47 bpm) with no ischemic ST/T wave changes    Echo 11/3/2022:  Large mass in the liver measuring 4.7 x 5 cm in the liver consider further imaging with CT  The estimated ejection fraction is 65%.  The left ventricle is normal in size with normal systolic function.  Normal left ventricular diastolic function.  Normal right ventricular size with normal right ventricular systolic function.  The estimated PA systolic pressure is 20 mmHg.  Intermediate central venous pressure (8 mmHg).    Nuclear Stress Test 11/3/2022:  Normal myocardial perfusion scan. There is no evidence of myocardial ischemia or infarction.    The gated perfusion images showed an ejection fraction of 62% at rest. The gated perfusion images showed an ejection fraction of 54% post stress. Normal ejection fraction is greater than 53%.    There is normal wall motion at rest and post stress.    LV cavity size is normal at rest and normal at stress.    The EKG portion of this study is negative for ischemia.    The patient reported no chest pain during the stress test.    There were no arrhythmias during stress.    There are no prior studies for comparison.    Assessment/Plan:  Mallorie Green is a 63 y.o. female with breast cancer s/p right " mastectomy with radiation/chemotherapy (2000), HLD, who presents for an initial appointment.     HLD- ASCVD risk score is 4%.  She is tolerating atorvastatin 40 mg daily with no issues.  LDL 89 with LFT's within normal limits on 11/12/2024.Continue atorvastatin 40 mg daily and ASA 81 mg daily.      Follow up in 6 months with lipids and cmp prior    Total duration of face to face visit time 45 minutes.  Total time spent counseling greater than fifty percent of total visit time.  Counseling included discussion regarding imaging findings, diagnosis, possibilities, treatment options, risks and benefits.  The patient had many questions regarding the options and long-term effects.    Barrera Sewell MD, PhD  Interventional Cardiology

## 2025-05-26 ENCOUNTER — HOSPITAL ENCOUNTER (OUTPATIENT)
Dept: RADIOLOGY | Facility: HOSPITAL | Age: 65
Discharge: HOME | End: 2025-05-26
Attending: INTERNAL MEDICINE
Payer: COMMERCIAL

## 2025-05-26 DIAGNOSIS — K76.89 LIVER CYST: ICD-10-CM

## 2025-05-26 PROCEDURE — 74183 MRI ABD W/O CNTR FLWD CNTR: CPT | Mod: TC

## 2025-05-26 PROCEDURE — A9585 GADOBUTROL INJECTION: HCPCS | Performed by: INTERNAL MEDICINE

## 2025-05-26 PROCEDURE — 74183 MRI ABD W/O CNTR FLWD CNTR: CPT | Mod: 26,,, | Performed by: RADIOLOGY

## 2025-05-26 PROCEDURE — 25500020 PHARM REV CODE 255: Performed by: INTERNAL MEDICINE

## 2025-05-26 RX ORDER — GADOBUTROL 604.72 MG/ML
10 INJECTION INTRAVENOUS
Status: COMPLETED | OUTPATIENT
Start: 2025-05-26 | End: 2025-05-26

## 2025-05-26 RX ADMIN — GADOBUTROL 10 ML: 604.72 INJECTION INTRAVENOUS at 06:05

## 2025-05-27 ENCOUNTER — RESULTS FOLLOW-UP (OUTPATIENT)
Dept: TRANSPLANT | Facility: CLINIC | Age: 65
End: 2025-05-27

## 2025-05-29 ENCOUNTER — TELEPHONE (OUTPATIENT)
Dept: HEPATOLOGY | Facility: CLINIC | Age: 65
End: 2025-05-29
Payer: COMMERCIAL

## 2025-05-29 NOTE — TELEPHONE ENCOUNTER
"----- Message from Alban sent at 5/29/2025  2:12 PM CDT -----  Cancel Existing AppointmentAppt Date: 5/29Type of appt: [facility] FOLLOWUP/OFFICE VISIT [6313]Physician: Juan Diego for cancellation?  Shayy: SelfContact Preference: [phone] Additional Information:  Requesting to keep 6/2 appt "Thank you for all that you do for our patients"  "

## 2025-06-02 ENCOUNTER — OFFICE VISIT (OUTPATIENT)
Dept: HEPATOLOGY | Facility: CLINIC | Age: 65
End: 2025-06-02
Payer: COMMERCIAL

## 2025-06-02 VITALS
SYSTOLIC BLOOD PRESSURE: 109 MMHG | DIASTOLIC BLOOD PRESSURE: 55 MMHG | HEIGHT: 64 IN | OXYGEN SATURATION: 98 % | BODY MASS INDEX: 23.56 KG/M2 | HEART RATE: 82 BPM | WEIGHT: 138 LBS | TEMPERATURE: 99 F

## 2025-06-02 DIAGNOSIS — K76.89 LIVER CYST: Primary | ICD-10-CM

## 2025-06-02 PROBLEM — K86.89 FATTY PANCREAS: Status: RESOLVED | Noted: 2023-11-27 | Resolved: 2025-06-02

## 2025-06-02 PROCEDURE — 3074F SYST BP LT 130 MM HG: CPT | Mod: CPTII,S$GLB,, | Performed by: INTERNAL MEDICINE

## 2025-06-02 PROCEDURE — 3008F BODY MASS INDEX DOCD: CPT | Mod: CPTII,S$GLB,, | Performed by: INTERNAL MEDICINE

## 2025-06-02 PROCEDURE — 3078F DIAST BP <80 MM HG: CPT | Mod: CPTII,S$GLB,, | Performed by: INTERNAL MEDICINE

## 2025-06-02 PROCEDURE — 1159F MED LIST DOCD IN RCRD: CPT | Mod: CPTII,S$GLB,, | Performed by: INTERNAL MEDICINE

## 2025-06-02 PROCEDURE — 1101F PT FALLS ASSESS-DOCD LE1/YR: CPT | Mod: CPTII,S$GLB,, | Performed by: INTERNAL MEDICINE

## 2025-06-02 PROCEDURE — 99214 OFFICE O/P EST MOD 30 MIN: CPT | Mod: S$GLB,,, | Performed by: INTERNAL MEDICINE

## 2025-06-02 PROCEDURE — 99999 PR PBB SHADOW E&M-EST. PATIENT-LVL IV: CPT | Mod: PBBFAC,,, | Performed by: INTERNAL MEDICINE

## 2025-06-02 PROCEDURE — 3288F FALL RISK ASSESSMENT DOCD: CPT | Mod: CPTII,S$GLB,, | Performed by: INTERNAL MEDICINE

## 2025-06-02 PROCEDURE — 1126F AMNT PAIN NOTED NONE PRSNT: CPT | Mod: CPTII,S$GLB,, | Performed by: INTERNAL MEDICINE

## 2025-06-02 PROCEDURE — 1160F RVW MEDS BY RX/DR IN RCRD: CPT | Mod: CPTII,S$GLB,, | Performed by: INTERNAL MEDICINE

## 2025-07-14 ENCOUNTER — LAB VISIT (OUTPATIENT)
Dept: LAB | Facility: HOSPITAL | Age: 65
End: 2025-07-14
Attending: INTERNAL MEDICINE
Payer: COMMERCIAL

## 2025-07-14 DIAGNOSIS — E78.2 MIXED HYPERLIPIDEMIA: ICD-10-CM

## 2025-07-14 LAB
ALBUMIN SERPL BCP-MCNC: 4.2 G/DL (ref 3.5–5.2)
ALP SERPL-CCNC: 80 UNIT/L (ref 40–150)
ALT SERPL W/O P-5'-P-CCNC: 21 UNIT/L (ref 10–44)
ANION GAP (OHS): 7 MMOL/L (ref 8–16)
AST SERPL-CCNC: 22 UNIT/L (ref 11–45)
BILIRUB SERPL-MCNC: 0.6 MG/DL (ref 0.1–1)
BUN SERPL-MCNC: 22 MG/DL (ref 8–23)
CALCIUM SERPL-MCNC: 9.1 MG/DL (ref 8.7–10.5)
CHLORIDE SERPL-SCNC: 108 MMOL/L (ref 95–110)
CHOLEST SERPL-MCNC: 157 MG/DL (ref 120–199)
CHOLEST/HDLC SERPL: 2.7 {RATIO} (ref 2–5)
CO2 SERPL-SCNC: 29 MMOL/L (ref 23–29)
CREAT SERPL-MCNC: 0.7 MG/DL (ref 0.5–1.4)
GFR SERPLBLD CREATININE-BSD FMLA CKD-EPI: >60 ML/MIN/1.73/M2
GLUCOSE SERPL-MCNC: 81 MG/DL (ref 70–110)
HDLC SERPL-MCNC: 59 MG/DL (ref 40–75)
HDLC SERPL: 37.6 % (ref 20–50)
LDLC SERPL CALC-MCNC: 89.2 MG/DL (ref 63–159)
NONHDLC SERPL-MCNC: 98 MG/DL
POTASSIUM SERPL-SCNC: 4.3 MMOL/L (ref 3.5–5.1)
PROT SERPL-MCNC: 7 GM/DL (ref 6–8.4)
SODIUM SERPL-SCNC: 144 MMOL/L (ref 136–145)
TRIGL SERPL-MCNC: 44 MG/DL (ref 30–150)

## 2025-07-14 PROCEDURE — 36415 COLL VENOUS BLD VENIPUNCTURE: CPT

## 2025-07-14 PROCEDURE — 82465 ASSAY BLD/SERUM CHOLESTEROL: CPT

## 2025-07-14 PROCEDURE — 80053 COMPREHEN METABOLIC PANEL: CPT

## 2025-07-24 ENCOUNTER — OFFICE VISIT (OUTPATIENT)
Dept: CARDIOLOGY | Facility: CLINIC | Age: 65
End: 2025-07-24
Payer: COMMERCIAL

## 2025-07-24 VITALS
HEART RATE: 64 BPM | OXYGEN SATURATION: 98 % | DIASTOLIC BLOOD PRESSURE: 60 MMHG | SYSTOLIC BLOOD PRESSURE: 126 MMHG | BODY MASS INDEX: 23.75 KG/M2 | HEIGHT: 64 IN | WEIGHT: 139.13 LBS

## 2025-07-24 DIAGNOSIS — E78.2 MIXED HYPERLIPIDEMIA: Primary | ICD-10-CM

## 2025-07-24 DIAGNOSIS — Z82.49 FAMILY HISTORY OF PREMATURE CAD: ICD-10-CM

## 2025-07-24 PROCEDURE — 1159F MED LIST DOCD IN RCRD: CPT | Mod: CPTII,S$GLB,, | Performed by: INTERNAL MEDICINE

## 2025-07-24 PROCEDURE — 3008F BODY MASS INDEX DOCD: CPT | Mod: CPTII,S$GLB,, | Performed by: INTERNAL MEDICINE

## 2025-07-24 PROCEDURE — 1126F AMNT PAIN NOTED NONE PRSNT: CPT | Mod: CPTII,S$GLB,, | Performed by: INTERNAL MEDICINE

## 2025-07-24 PROCEDURE — 3288F FALL RISK ASSESSMENT DOCD: CPT | Mod: CPTII,S$GLB,, | Performed by: INTERNAL MEDICINE

## 2025-07-24 PROCEDURE — 3078F DIAST BP <80 MM HG: CPT | Mod: CPTII,S$GLB,, | Performed by: INTERNAL MEDICINE

## 2025-07-24 PROCEDURE — 99999 PR PBB SHADOW E&M-EST. PATIENT-LVL III: CPT | Mod: PBBFAC,,, | Performed by: INTERNAL MEDICINE

## 2025-07-24 PROCEDURE — 1101F PT FALLS ASSESS-DOCD LE1/YR: CPT | Mod: CPTII,S$GLB,, | Performed by: INTERNAL MEDICINE

## 2025-07-24 PROCEDURE — 3074F SYST BP LT 130 MM HG: CPT | Mod: CPTII,S$GLB,, | Performed by: INTERNAL MEDICINE

## 2025-07-24 PROCEDURE — 99215 OFFICE O/P EST HI 40 MIN: CPT | Mod: S$GLB,,, | Performed by: INTERNAL MEDICINE

## 2025-07-24 RX ORDER — ATORVASTATIN CALCIUM 80 MG/1
80 TABLET, FILM COATED ORAL DAILY
Qty: 90 TABLET | Refills: 3 | Status: SHIPPED | OUTPATIENT
Start: 2025-07-24 | End: 2026-07-24

## 2025-07-24 NOTE — PATIENT INSTRUCTIONS
Assessment/Plan:  Mallorie Green is a 65 y.o. female with breast cancer s/p right mastectomy with radiation/chemotherapy (2000), HLD, who presents for a follow up appointment.     HLD- ASCVD risk score is The 10-year ASCVD risk score (Rodrigue ESQUIVEL, et al., 2019) is: 5.2%. LDL 89 with LFT's within normal limits on 7/14/2025.  Increase atorvastatin to 80 mg daily. Continue ASA 81 mg daily.      Follow up in 6 months with lipids and cmp prior

## 2025-07-24 NOTE — PROGRESS NOTES
"[facility]      Chief Complaint   Patient presents with    Hyperlipidemia       Patient ID: Mallorie Green is a 65 y.o. female with breast cancer s/p right mastectomy with radiation/chemotherapy (2000), HLD, who presents for a follow up appointment.  Pertinent history/events are as follows:     -Pt presents to establish cardiac care.    -At our initial clinic visit on 11/2/2022, Mrs. Green reports chest pain localized at the left breast area (behind the clavicle) when she "gets upset".  Pain is described as "sharp", 6/10 in intensity, lasting a few seconds.  Episodes occur 2-3 times per week.  Last episode occurred last night.  Pt currently with no chest pain.  She reports worsening fatigue over the past 1 year.  No smoking history.  Reports family history of CAD with MI in mother at age 55.  Pt is a retired .  Outside labs from 9/17/2022 shows total cholesterol 230 with .  EKG today shows sinus bradycardia (rate 47 bpm) with no ischemic ST/T wave changes.   Plan:   Chest Pain/Fatigue- Pt with risk factors for CAD.  Check exercise nuclear stress test and echo to evaluate further.  HLD- ASCVD risk score is 4%.  Start atorvastatin 40 mg daily and ASA 81 mg daily.      11/4/2022: Echo done yesterday shows large mass in the liver measuring 4.7 x 5 cm.  Echo otherwise is within normal limits with normal LV systolic and diastolic function and no valve abnormalities.  Stress test done yesterday shows no evidence of ischemia.  Plan:  -refer to Hepatology in General surgery for further evaluation of the liver mass.  -hold off on starting atorvastatin until further evaluation of liver mass.  -results and plan discussed in detail Mrs. Green, who voiced understanding.    -At follow up clinic visit on 3/15/2023, Mrs. Green reported no significant chest pain, SOB, LE edema, TIA symptoms or syncope.  Echo on 11/3/2022 revealed a large mass in the liver measuring 4.7 x 5 cm in the liver consider further " imaging with CT.  EF 65% with no significant valvular abnormalities.  Nuclear Stress Test on 11/3/2022 revealed normal myocardial perfusion with no evidence of myocardial ischemia or infarction.  LDL 75 on 3/8/2022.  Plan:   Chest Pain/Fatigue- Pt with no significant chest pain or fatigue currently.  Echo on 11/3/2022 revealed a large mass in the liver measuring 4.7 x 5 cm in the liver consider further imaging with CT.  EF 65% with no significant valvular abnormalities.  Nuclear Stress Test on 11/3/2022 revealed normal myocardial perfusion with no evidence of myocardial ischemia or infarction.  Continue aggressive risk factor modification.    HLD- ASCVD risk score is 4%.   LDL 75 on 3/8/2022.  Continue atorvastatin 40 mg daily and ASA 81 mg daily.    6/21/2024 clinic visit: Mrs. Green reports being off atorvastatin since 11/2023 due to concern for fatty pancreas based on an erroneous ultrasound report.  She has now restarted atorvastatin 40 mg daily.  LDL 73 ln 9/18/2023.    Plan:  HLD- ASCVD risk score is 4%.  Mrs. Green reports being off atorvastatin since 11/2023 due to concern for fatty pancreas based on an erroneous ultrasound report.  She has now restarted atorvastatin 40 mg daily.  LDL 73 ln 9/18/2023.  Continue atorvastatin 40 mg daily and ASA 81 mg daily.  Check lipids now and prior to next visit.     11/11/2024 clinic visit: Mrs. Green reports doing well with no chest pain or SOB. She is tolerating atorvastatin 40 mg daily with no issues. LDL 89 with LFT's within normal limits on 11/12/2024.  Plan:  HLD- ASCVD risk score is 4%.  She is tolerating atorvastatin 40 mg daily with no issues.  LDL 89 with LFT's within normal limits on 11/12/2024.Continue atorvastatin 40 mg daily and ASA 81 mg daily.      HPI:  Mrs. Green reports doing well with no chest pain or SOB. She is tolerating atorvastatin 40 mg daily with no issues. LDL 89 with LFT's within normal limits on 7/14/2025.     Past Medical History:   Diagnosis Date     Cancer     Fatty pancreas 2023     Past Surgical History:   Procedure Laterality Date    BRAIN SURGERY      BREAST SURGERY       SECTION      CHOLECYSTECTOMY      HYSTERECTOMY       Social History     Socioeconomic History    Marital status:    Tobacco Use    Smoking status: Never    Smokeless tobacco: Never   Substance and Sexual Activity    Alcohol use: No    Drug use: No    Sexual activity: Never     Social Drivers of Health     Financial Resource Strain: Low Risk  (2024)    Overall Financial Resource Strain (CARDIA)     Difficulty of Paying Living Expenses: Not hard at all   Food Insecurity: No Food Insecurity (2024)    Hunger Vital Sign     Worried About Running Out of Food in the Last Year: Never true     Ran Out of Food in the Last Year: Never true   Physical Activity: Sufficiently Active (2024)    Exercise Vital Sign     Days of Exercise per Week: 7 days     Minutes of Exercise per Session: 60 min   Stress: Stress Concern Present (2024)    [employer] [place] [place] [employer] - Occupational Stress Questionnaire     Feeling of Stress : To some extent   Housing Stability: Unknown (2024)    Housing Stability Vital Sign     Unable to Pay for Housing in the Last Year: No     No family history on file.    Review of patient's allergies indicates:   Allergen Reactions    Codeine     Levaquin [levofloxacin]     Opioids - morphine analogues Nausea And Vomiting           Review of Systems  Constitution: Denies chills, fever, and sweats.  HENT: Denies headaches or blurry vision.  Cardiovascular: Positive for chest pain.  Respiratory: Denies cough or shortness of breath.  Gastrointestinal: Denies abdominal pain, nausea, or vomiting.  Musculoskeletal: Denies muscle cramps.  Neurological: Denies dizziness or focal weakness.  Psychiatric/Behavioral: Normal mental status.  Hematologic/Lymphatic: Denies bleeding problem or easy bruising/bleeding.  Skin: Denies rash or  "suspicious lesions    Physical Examination  /60 (BP Location: Left arm, Patient Position: Sitting)   Pulse 64   Ht 5' 4" (1.626 m)   Wt 63.1 kg (139 lb 1.8 oz)   SpO2 98%   BMI 23.88 kg/m²     Video visit performed    Labs:  Most Recent Data  CBC:   Lab Results   Component Value Date    WBC 3.51 (L) 05/26/2025    HGB 13.1 05/26/2025    HCT 40.8 05/26/2025     05/26/2025    MCV 93 05/26/2025    RDW 12.0 05/26/2025     BMP:   Lab Results   Component Value Date     07/14/2025    K 4.3 07/14/2025     07/14/2025    CO2 29 07/14/2025    BUN 22 07/14/2025    CREATININE 0.7 07/14/2025    GLU 81 07/14/2025    CALCIUM 9.1 07/14/2025     LFTS;   Lab Results   Component Value Date    PROT 7.0 07/14/2025    ALBUMIN 4.2 07/14/2025    BILITOT 0.6 07/14/2025    AST 22 07/14/2025    ALKPHOS 80 07/14/2025    ALT 21 07/14/2025     COAGS:   Lab Results   Component Value Date    INR 1.0 05/26/2025    PROTIME 11.0 05/26/2025     FLP:   Lab Results   Component Value Date    CHOL 157 07/14/2025    HDL 59 07/14/2025    LDLCALC 89.2 07/14/2025    TRIG 44 07/14/2025    CHOLHDL 37.6 07/14/2025     CARDIAC: No results found for: "TROPONINI", "CKTOTAL", "CKMB", "BNP"    Imaging:    EKG 11/2/2022:  Sinus bradycardia (rate 47 bpm) with no ischemic ST/T wave changes    Echo 11/3/2022:  Large mass in the liver measuring 4.7 x 5 cm in the liver consider further imaging with CT  The estimated ejection fraction is 65%.  The left ventricle is normal in size with normal systolic function.  Normal left ventricular diastolic function.  Normal right ventricular size with normal right ventricular systolic function.  The estimated PA systolic pressure is 20 mmHg.  Intermediate central venous pressure (8 mmHg).    Nuclear Stress Test 11/3/2022:  Normal myocardial perfusion scan. There is no evidence of myocardial ischemia or infarction.    The gated perfusion images showed an ejection fraction of 62% at rest. The gated perfusion " images showed an ejection fraction of 54% post stress. Normal ejection fraction is greater than 53%.    There is normal wall motion at rest and post stress.    LV cavity size is normal at rest and normal at stress.    The EKG portion of this study is negative for ischemia.    The patient reported no chest pain during the stress test.    There were no arrhythmias during stress.    There are no prior studies for comparison.    Assessment/Plan:  Mallorie Green is a 65 y.o. female with breast cancer s/p right mastectomy with radiation/chemotherapy (2000), HLD, who presents for a follow up appointment.     HLD- ASCVD risk score is The 10-year ASCVD risk score (Rodrigue ESQUIVEL, et al., 2019) is: 5.2%. LDL 89 with LFT's within normal limits on 7/14/2025.  Increase atorvastatin to 80 mg daily. Continue ASA 81 mg daily.      Follow up in 6 months with lipids and cmp prior    Total duration of face to face visit time 15 minutes.  Total time spent counseling greater than fifty percent of total visit time.  Counseling included discussion regarding imaging findings, diagnosis, possibilities, treatment options, risks and benefits.  The patient had many questions regarding the options and long-term effects.    Barrera Sewell MD, PhD  Interventional Cardiology